# Patient Record
Sex: MALE | Race: WHITE | Employment: OTHER | URBAN - METROPOLITAN AREA
[De-identification: names, ages, dates, MRNs, and addresses within clinical notes are randomized per-mention and may not be internally consistent; named-entity substitution may affect disease eponyms.]

---

## 2023-05-09 PROBLEM — G89.18 POST-OP PAIN: Status: ACTIVE | Noted: 2023-05-09

## 2023-05-09 PROBLEM — M25.512 BILATERAL SHOULDER PAIN: Status: ACTIVE | Noted: 2023-05-09

## 2023-05-09 PROBLEM — M25.511 BILATERAL SHOULDER PAIN: Status: ACTIVE | Noted: 2023-05-09

## 2023-05-09 PROBLEM — R73.03 PREDIABETES: Status: ACTIVE | Noted: 2023-05-09

## 2023-05-09 PROBLEM — G47.9 DIFFICULTY SLEEPING: Status: ACTIVE | Noted: 2023-05-09

## 2023-05-09 PROBLEM — J98.8 RESPIRATORY TRACT INFECTION: Status: ACTIVE | Noted: 2023-05-09

## 2023-05-09 PROBLEM — I65.29 CAROTID ARTERY STENOSIS: Status: ACTIVE | Noted: 2023-05-09

## 2023-05-09 PROBLEM — J38.01 VOCAL CORD PARALYSIS, UNILATERAL COMPLETE: Status: ACTIVE | Noted: 2023-05-09

## 2023-05-09 PROBLEM — G47.33 OSA ON CPAP: Status: ACTIVE | Noted: 2023-05-09

## 2023-05-09 PROBLEM — I25.10 CAD (CORONARY ARTERY DISEASE): Status: ACTIVE | Noted: 2023-05-09

## 2023-05-09 PROBLEM — K21.9 GERD (GASTROESOPHAGEAL REFLUX DISEASE): Status: ACTIVE | Noted: 2023-05-09

## 2023-05-09 PROBLEM — J20.9 ACUTE BRONCHITIS, UNSPECIFIED: Status: ACTIVE | Noted: 2023-05-09

## 2023-05-09 PROBLEM — G45.9 TIA (TRANSIENT ISCHEMIC ATTACK): Status: ACTIVE | Noted: 2023-05-09

## 2023-05-09 PROBLEM — Z95.1 S/P CABG (CORONARY ARTERY BYPASS GRAFT): Status: ACTIVE | Noted: 2023-05-09

## 2023-05-09 PROBLEM — G56.02 CARPAL TUNNEL SYNDROME OF LEFT WRIST: Status: ACTIVE | Noted: 2023-05-09

## 2023-05-09 PROBLEM — I20.9 ANGINA PECTORIS (CMS-HCC): Status: ACTIVE | Noted: 2023-05-09

## 2023-05-09 PROBLEM — R49.0 DYSPHONIA: Status: ACTIVE | Noted: 2023-05-09

## 2023-05-09 PROBLEM — R05.8 PRODUCTIVE COUGH: Status: ACTIVE | Noted: 2023-05-09

## 2023-05-09 PROBLEM — L03.039 CELLULITIS OF GREAT TOE: Status: ACTIVE | Noted: 2023-05-09

## 2023-05-09 PROBLEM — R31.29 OTHER MICROSCOPIC HEMATURIA: Status: ACTIVE | Noted: 2023-05-09

## 2023-05-09 PROBLEM — I10 BENIGN ESSENTIAL HTN: Status: ACTIVE | Noted: 2023-05-09

## 2023-05-09 PROBLEM — I42.9 CARDIOMYOPATHY (MULTI): Status: ACTIVE | Noted: 2023-05-09

## 2023-05-09 PROBLEM — N52.9 ERECTILE DYSFUNCTION: Status: ACTIVE | Noted: 2023-05-09

## 2023-05-09 PROBLEM — R13.12 DYSPHAGIA, OROPHARYNGEAL PHASE: Status: ACTIVE | Noted: 2023-05-09

## 2023-05-09 PROBLEM — E78.5 HYPERLIPEMIA: Status: ACTIVE | Noted: 2023-05-09

## 2023-05-09 PROBLEM — M17.0 OSTEOARTHRITIS OF KNEES, BILATERAL: Status: ACTIVE | Noted: 2023-05-09

## 2023-05-09 PROBLEM — R93.1 ABNORMAL ECHOCARDIOGRAM: Status: ACTIVE | Noted: 2023-05-09

## 2023-05-09 PROBLEM — I65.22 OCCLUSION OF LEFT CAROTID ARTERY: Status: ACTIVE | Noted: 2023-05-09

## 2023-05-09 PROBLEM — K14.8 TONGUE DEVIATION: Status: ACTIVE | Noted: 2023-05-09

## 2023-05-09 RX ORDER — HYDROCHLOROTHIAZIDE 25 MG/1
1 TABLET ORAL DAILY
COMMUNITY
End: 2023-05-30 | Stop reason: ALTCHOICE

## 2023-05-09 RX ORDER — IRON POLYSACCHARIDE COMPLEX 150 MG
150 CAPSULE ORAL DAILY
COMMUNITY
Start: 2017-07-25 | End: 2023-05-30 | Stop reason: ALTCHOICE

## 2023-05-09 RX ORDER — DOCUSATE SODIUM 100 MG/1
100 CAPSULE, LIQUID FILLED ORAL 2 TIMES DAILY
COMMUNITY
Start: 2017-07-25 | End: 2023-05-30 | Stop reason: ALTCHOICE

## 2023-05-09 RX ORDER — PANTOPRAZOLE SODIUM 40 MG/1
1 TABLET, DELAYED RELEASE ORAL DAILY
COMMUNITY
Start: 2017-07-25 | End: 2023-05-30 | Stop reason: ALTCHOICE

## 2023-05-09 RX ORDER — LOSARTAN POTASSIUM 25 MG/1
25 TABLET ORAL
COMMUNITY
Start: 2017-07-25 | End: 2023-05-30 | Stop reason: ALTCHOICE

## 2023-05-09 RX ORDER — MULTIVIT-MIN/IRON FUM/FOLIC AC 7.5 MG-4
1 TABLET ORAL DAILY
COMMUNITY
Start: 2017-07-25

## 2023-05-09 RX ORDER — OMEGA-3 FATTY ACIDS/FISH OIL 340-1000MG
CAPSULE ORAL
COMMUNITY
End: 2023-05-30 | Stop reason: ALTCHOICE

## 2023-05-09 RX ORDER — LOSARTAN POTASSIUM 50 MG/1
1 TABLET ORAL DAILY
COMMUNITY
Start: 2017-08-08 | End: 2023-05-30 | Stop reason: ALTCHOICE

## 2023-05-09 RX ORDER — ACETAMINOPHEN 325 MG/1
325 TABLET ORAL
COMMUNITY
Start: 2017-07-25 | End: 2024-03-18

## 2023-05-09 RX ORDER — METFORMIN HYDROCHLORIDE 1000 MG/1
1 TABLET ORAL 2 TIMES DAILY
COMMUNITY
Start: 2021-07-07 | End: 2023-06-19 | Stop reason: SDUPTHER

## 2023-05-09 RX ORDER — CLOPIDOGREL BISULFATE 75 MG/1
1 TABLET ORAL DAILY
COMMUNITY
Start: 2017-07-17 | End: 2023-06-30 | Stop reason: SDUPTHER

## 2023-05-09 RX ORDER — ATORVASTATIN CALCIUM 40 MG/1
1 TABLET, FILM COATED ORAL DAILY
COMMUNITY
Start: 2017-07-25 | End: 2023-06-19 | Stop reason: SDUPTHER

## 2023-05-09 RX ORDER — OXYCODONE HYDROCHLORIDE 5 MG/1
5 TABLET ORAL EVERY 4 HOURS PRN
COMMUNITY
Start: 2017-07-25 | End: 2023-05-30 | Stop reason: ALTCHOICE

## 2023-05-09 RX ORDER — FUROSEMIDE 40 MG/1
1 TABLET ORAL DAILY
COMMUNITY
Start: 2017-07-25 | End: 2023-05-30 | Stop reason: ALTCHOICE

## 2023-05-09 RX ORDER — POLYETHYLENE GLYCOL 3350 17 G/17G
17 POWDER, FOR SOLUTION ORAL DAILY
COMMUNITY
Start: 2017-07-25 | End: 2023-05-30 | Stop reason: ALTCHOICE

## 2023-05-09 RX ORDER — ASPIRIN 81 MG/1
81 TABLET ORAL DAILY
COMMUNITY

## 2023-05-09 RX ORDER — ACETAMINOPHEN 500 MG
TABLET ORAL DAILY
COMMUNITY
End: 2023-05-30 | Stop reason: ALTCHOICE

## 2023-05-09 RX ORDER — CARVEDILOL 25 MG/1
1 TABLET ORAL 2 TIMES DAILY
COMMUNITY
Start: 2017-07-25 | End: 2023-06-19 | Stop reason: SDUPTHER

## 2023-05-30 ENCOUNTER — OFFICE VISIT (OUTPATIENT)
Dept: PRIMARY CARE | Facility: CLINIC | Age: 73
End: 2023-05-30
Payer: MEDICARE

## 2023-05-30 VITALS
RESPIRATION RATE: 16 BRPM | WEIGHT: 240 LBS | BODY MASS INDEX: 38.57 KG/M2 | TEMPERATURE: 97.5 F | HEIGHT: 66 IN | OXYGEN SATURATION: 97 % | DIASTOLIC BLOOD PRESSURE: 62 MMHG | SYSTOLIC BLOOD PRESSURE: 136 MMHG | HEART RATE: 52 BPM

## 2023-05-30 DIAGNOSIS — R73.03 PREDIABETES: ICD-10-CM

## 2023-05-30 DIAGNOSIS — E53.8 B12 DEFICIENCY: ICD-10-CM

## 2023-05-30 DIAGNOSIS — D64.9 ANEMIA, UNSPECIFIED TYPE: ICD-10-CM

## 2023-05-30 DIAGNOSIS — Z95.1 S/P CABG (CORONARY ARTERY BYPASS GRAFT): ICD-10-CM

## 2023-05-30 DIAGNOSIS — I10 BENIGN ESSENTIAL HTN: Primary | ICD-10-CM

## 2023-05-30 DIAGNOSIS — E78.49 OTHER HYPERLIPIDEMIA: ICD-10-CM

## 2023-05-30 PROCEDURE — 99213 OFFICE O/P EST LOW 20 MIN: CPT | Performed by: STUDENT IN AN ORGANIZED HEALTH CARE EDUCATION/TRAINING PROGRAM

## 2023-05-30 PROCEDURE — 3008F BODY MASS INDEX DOCD: CPT | Performed by: STUDENT IN AN ORGANIZED HEALTH CARE EDUCATION/TRAINING PROGRAM

## 2023-05-30 PROCEDURE — 1159F MED LIST DOCD IN RCRD: CPT | Performed by: STUDENT IN AN ORGANIZED HEALTH CARE EDUCATION/TRAINING PROGRAM

## 2023-05-30 PROCEDURE — 3078F DIAST BP <80 MM HG: CPT | Performed by: STUDENT IN AN ORGANIZED HEALTH CARE EDUCATION/TRAINING PROGRAM

## 2023-05-30 PROCEDURE — 3075F SYST BP GE 130 - 139MM HG: CPT | Performed by: STUDENT IN AN ORGANIZED HEALTH CARE EDUCATION/TRAINING PROGRAM

## 2023-05-30 PROCEDURE — 1160F RVW MEDS BY RX/DR IN RCRD: CPT | Performed by: STUDENT IN AN ORGANIZED HEALTH CARE EDUCATION/TRAINING PROGRAM

## 2023-05-30 PROCEDURE — 1036F TOBACCO NON-USER: CPT | Performed by: STUDENT IN AN ORGANIZED HEALTH CARE EDUCATION/TRAINING PROGRAM

## 2023-05-30 SDOH — ECONOMIC STABILITY: FOOD INSECURITY: WITHIN THE PAST 12 MONTHS, THE FOOD YOU BOUGHT JUST DIDN'T LAST AND YOU DIDN'T HAVE MONEY TO GET MORE.: NEVER TRUE

## 2023-05-30 SDOH — ECONOMIC STABILITY: FOOD INSECURITY: WITHIN THE PAST 12 MONTHS, YOU WORRIED THAT YOUR FOOD WOULD RUN OUT BEFORE YOU GOT MONEY TO BUY MORE.: NEVER TRUE

## 2023-05-30 ASSESSMENT — ENCOUNTER SYMPTOMS
PALPITATIONS: 0
DIARRHEA: 0
FEVER: 0
NAUSEA: 0
VOMITING: 0
UNEXPECTED WEIGHT CHANGE: 0
MUSCULOSKELETAL NEGATIVE: 1
CHILLS: 0
DIZZINESS: 0
OCCASIONAL FEELINGS OF UNSTEADINESS: 0
SHORTNESS OF BREATH: 0
CONFUSION: 0
ABDOMINAL PAIN: 0
LOSS OF SENSATION IN FEET: 0
DEPRESSION: 0
COUGH: 0
WHEEZING: 0
COLOR CHANGE: 0
FATIGUE: 0
CONSTIPATION: 0
HEADACHES: 0

## 2023-05-30 ASSESSMENT — PATIENT HEALTH QUESTIONNAIRE - PHQ9
1. LITTLE INTEREST OR PLEASURE IN DOING THINGS: NOT AT ALL
SUM OF ALL RESPONSES TO PHQ9 QUESTIONS 1 & 2: 0
2. FEELING DOWN, DEPRESSED OR HOPELESS: NOT AT ALL

## 2023-05-30 ASSESSMENT — LIFESTYLE VARIABLES
SKIP TO QUESTIONS 9-10: 1
HOW OFTEN DO YOU HAVE A DRINK CONTAINING ALCOHOL: NEVER
HOW MANY STANDARD DRINKS CONTAINING ALCOHOL DO YOU HAVE ON A TYPICAL DAY: PATIENT DOES NOT DRINK
AUDIT-C TOTAL SCORE: 0
HOW OFTEN DO YOU HAVE SIX OR MORE DRINKS ON ONE OCCASION: NEVER

## 2023-05-30 NOTE — PROGRESS NOTES
"Subjective   Patient ID: Rommel Thompson is a 72 y.o. male who presents for New Patient Visit (Dr. Tip marinelli), Hypertension (Has BP log.), and Insomnia.    HPI   He is here to [facility] and also for FU visit. PMH: CAD s/p bypass surgery: follows w/ Dr. Guerrero.     # HTN/HLD  - io BP was 136/62 and home BP ~ 130/70-80s.   - takes Coreg 25 mg bid and losartan 50 mg daily  - takes atorva 40 mg w/o issues     # Prediabetes  - last A1c 5.7 (07/22)   - Takes metformin 1000 mg bid     # CTS   - getting surgery with ortho soon; recently got clearance from cards; so hope to get surgery done     Review of Systems   Constitutional:  Negative for chills, fatigue, fever and unexpected weight change.   HENT: Negative.     Respiratory:  Negative for cough, shortness of breath and wheezing.    Cardiovascular:  Negative for chest pain, palpitations and leg swelling.   Gastrointestinal:  Negative for abdominal pain, constipation, diarrhea, nausea and vomiting.   Musculoskeletal: Negative.    Skin:  Negative for color change and rash.   Neurological:  Negative for dizziness and headaches.   Psychiatric/Behavioral:  Negative for behavioral problems and confusion.        Objective   /62 (BP Location: Right arm, Patient Position: Sitting, BP Cuff Size: Large adult)   Pulse 52   Temp 36.4 °C (97.5 °F)   Resp 16   Ht 1.676 m (5' 6\")   Wt 109 kg (240 lb)   SpO2 97%   BMI 38.74 kg/m²     Physical Exam  Vitals and nursing note reviewed.   Constitutional:       Appearance: Normal appearance. He is obese.   Cardiovascular:      Rate and Rhythm: Normal rate and regular rhythm.      Pulses: Normal pulses.      Heart sounds: Normal heart sounds.   Pulmonary:      Effort: Pulmonary effort is normal. No respiratory distress.      Breath sounds: Normal breath sounds.   Abdominal:      General: Abdomen is flat. Bowel sounds are normal.      Palpations: Abdomen is soft.   Musculoskeletal:         General: Normal range of motion. "   Neurological:      General: No focal deficit present.      Mental Status: He is alert and oriented to person, place, and time.   Psychiatric:         Mood and Affect: Mood normal.         Behavior: Behavior normal.       Assessment/Plan   He is here to [facility] care and also for FU visit. Overall doing okay, no major concerns today and is clinically & vitally stable. He is UTD on refills for now, will call for refills as indicated.   He will call to [facility] CTS surgery soon as he just got clearance from cards. Has insomnia likely from CTS, declined to be on meds for now; Will let us know post surgery if insomnia continues. Plan as follows     # HTN/HLD # CAD   - controlled, cont same   - rec dash & heart healthy diet     # Prediabetes   - last A1c 5.7 (07/22); repeat A1c as below   - well controlled, cont same     # Anemia  - likely 2/2 B12 def  - cont B12 daily as usual  - repeat B12 as below     Problem List Items Addressed This Visit          Circulatory    Benign essential HTN - Primary    S/P CABG (coronary artery bypass graft)    Relevant Orders    Lipid Panel       Endocrine/Metabolic    Prediabetes    Relevant Orders    Hemoglobin A1c       Other    Hyperlipemia    Relevant Orders    Lipid Panel     Other Visit Diagnoses       Anemia, unspecified type        Relevant Orders    CBC    B12 deficiency        Relevant Orders    Vitamin B12          Rtc in 3mo MCR/FU   Pilo Marquez MD    [facility], Family Medicine

## 2023-06-13 ENCOUNTER — LAB (OUTPATIENT)
Dept: LAB | Facility: LAB | Age: 73
End: 2023-06-13
Payer: MEDICARE

## 2023-06-13 DIAGNOSIS — R73.03 PREDIABETES: ICD-10-CM

## 2023-06-13 DIAGNOSIS — E78.49 OTHER HYPERLIPIDEMIA: ICD-10-CM

## 2023-06-13 DIAGNOSIS — E53.8 B12 DEFICIENCY: ICD-10-CM

## 2023-06-13 DIAGNOSIS — D64.9 ANEMIA, UNSPECIFIED TYPE: ICD-10-CM

## 2023-06-13 DIAGNOSIS — Z95.1 S/P CABG (CORONARY ARTERY BYPASS GRAFT): ICD-10-CM

## 2023-06-13 LAB
CHOLESTEROL (MG/DL) IN SER/PLAS: 141 MG/DL (ref 0–199)
CHOLESTEROL IN HDL (MG/DL) IN SER/PLAS: 41.1 MG/DL
CHOLESTEROL/HDL RATIO: 3.4
COBALAMIN (VITAMIN B12) (PG/ML) IN SER/PLAS: 409 PG/ML (ref 211–911)
ERYTHROCYTE DISTRIBUTION WIDTH (RATIO) BY AUTOMATED COUNT: 13.9 % (ref 11.5–14.5)
ERYTHROCYTE MEAN CORPUSCULAR HEMOGLOBIN CONCENTRATION (G/DL) BY AUTOMATED: 32.6 G/DL (ref 32–36)
ERYTHROCYTE MEAN CORPUSCULAR VOLUME (FL) BY AUTOMATED COUNT: 94 FL (ref 80–100)
ERYTHROCYTES (10*6/UL) IN BLOOD BY AUTOMATED COUNT: 4.35 X10E12/L (ref 4.5–5.9)
ESTIMATED AVERAGE GLUCOSE FOR HBA1C: 117 MG/DL
HEMATOCRIT (%) IN BLOOD BY AUTOMATED COUNT: 40.8 % (ref 41–52)
HEMOGLOBIN (G/DL) IN BLOOD: 13.3 G/DL (ref 13.5–17.5)
HEMOGLOBIN A1C/HEMOGLOBIN TOTAL IN BLOOD: 5.7 %
LDL: 77 MG/DL (ref 0–99)
LEUKOCYTES (10*3/UL) IN BLOOD BY AUTOMATED COUNT: 7.8 X10E9/L (ref 4.4–11.3)
PLATELETS (10*3/UL) IN BLOOD AUTOMATED COUNT: 183 X10E9/L (ref 150–450)
TRIGLYCERIDE (MG/DL) IN SER/PLAS: 116 MG/DL (ref 0–149)
VLDL: 23 MG/DL (ref 0–40)

## 2023-06-13 PROCEDURE — 36415 COLL VENOUS BLD VENIPUNCTURE: CPT

## 2023-06-13 PROCEDURE — 80061 LIPID PANEL: CPT

## 2023-06-13 PROCEDURE — 85027 COMPLETE CBC AUTOMATED: CPT

## 2023-06-13 PROCEDURE — 82607 VITAMIN B-12: CPT

## 2023-06-13 PROCEDURE — 83036 HEMOGLOBIN GLYCOSYLATED A1C: CPT

## 2023-06-19 DIAGNOSIS — E78.5 HYPERLIPIDEMIA, UNSPECIFIED HYPERLIPIDEMIA TYPE: Primary | ICD-10-CM

## 2023-06-19 DIAGNOSIS — I10 HTN (HYPERTENSION), BENIGN: Chronic | ICD-10-CM

## 2023-06-19 DIAGNOSIS — R73.03 PREDIABETES: ICD-10-CM

## 2023-06-19 DIAGNOSIS — Z95.1 S/P CABG (CORONARY ARTERY BYPASS GRAFT): ICD-10-CM

## 2023-06-19 PROBLEM — R19.7 ACUTE DIARRHEA: Status: ACTIVE | Noted: 2023-06-19

## 2023-06-19 PROBLEM — M54.12 CERVICAL RADICULOPATHY, CHRONIC: Status: ACTIVE | Noted: 2023-06-19

## 2023-06-19 PROBLEM — M19.011 OSTEOARTHRITIS OF RIGHT SHOULDER: Status: ACTIVE | Noted: 2023-06-19

## 2023-06-19 PROBLEM — D64.9 ANEMIA: Status: ACTIVE | Noted: 2023-06-19

## 2023-06-19 PROBLEM — M19.012 OSTEOARTHRITIS OF LEFT SHOULDER: Status: ACTIVE | Noted: 2023-06-19

## 2023-06-19 PROBLEM — E53.8 VITAMIN B12 DEFICIENCY: Status: ACTIVE | Noted: 2023-06-19

## 2023-06-19 RX ORDER — ACETAMINOPHEN, DEXTROMETHORPHAN HBR, DOXYLAMINE SUCCINATE, PHENYLEPHRINE HCL 650; 20; 12.5; 1 MG/30ML; MG/30ML; MG/30ML; MG/30ML
SOLUTION ORAL
COMMUNITY
Start: 2022-12-02

## 2023-06-19 RX ORDER — LOSARTAN POTASSIUM 50 MG/1
50 TABLET ORAL DAILY
COMMUNITY
End: 2023-06-19 | Stop reason: SDUPTHER

## 2023-06-19 NOTE — TELEPHONE ENCOUNTER
Pt also needs instruction.  He is having carpal tunnel surgery 6/27.  They told him to stop the aspirin but said his pcp needs to advise on the plavix.  Should he stop it and when?  Thank you. Shira.

## 2023-06-21 RX ORDER — ATORVASTATIN CALCIUM 40 MG/1
40 TABLET, FILM COATED ORAL DAILY
Qty: 90 TABLET | Refills: 0 | Status: SHIPPED | OUTPATIENT
Start: 2023-06-21 | End: 2023-09-07 | Stop reason: SDUPTHER

## 2023-06-21 RX ORDER — CARVEDILOL 25 MG/1
25 TABLET ORAL 2 TIMES DAILY
Qty: 180 TABLET | Refills: 0 | Status: SHIPPED | OUTPATIENT
Start: 2023-06-21 | End: 2023-09-07 | Stop reason: SDUPTHER

## 2023-06-21 RX ORDER — CLOPIDOGREL BISULFATE 75 MG/1
75 TABLET ORAL DAILY
Qty: 90 TABLET | Refills: 0 | Status: CANCELLED | OUTPATIENT
Start: 2023-06-21

## 2023-06-21 RX ORDER — LOSARTAN POTASSIUM 50 MG/1
50 TABLET ORAL DAILY
Qty: 90 TABLET | Refills: 0 | Status: SHIPPED | OUTPATIENT
Start: 2023-06-21 | End: 2023-07-31 | Stop reason: DRUGHIGH

## 2023-06-21 RX ORDER — METFORMIN HYDROCHLORIDE 1000 MG/1
1000 TABLET ORAL 2 TIMES DAILY
Qty: 180 TABLET | Refills: 0 | Status: SHIPPED | OUTPATIENT
Start: 2023-06-21 | End: 2023-09-07 | Stop reason: SDUPTHER

## 2023-06-21 NOTE — TELEPHONE ENCOUNTER
Pt saw cardiology and they told him to hold plavix for 3 days prior to surgery.  Pt states he has always got plavix from this office.  It looks like Tip did rx it last on 12/22.  Pt is about to run out.  Would you fill?

## 2023-06-27 ENCOUNTER — HOSPITAL ENCOUNTER (OUTPATIENT)
Dept: DATA CONVERSION | Facility: HOSPITAL | Age: 73
End: 2023-06-27
Attending: ORTHOPAEDIC SURGERY | Admitting: ORTHOPAEDIC SURGERY
Payer: MEDICARE

## 2023-06-27 DIAGNOSIS — G56.02 CARPAL TUNNEL SYNDROME, LEFT UPPER LIMB: ICD-10-CM

## 2023-06-27 DIAGNOSIS — G62.9 POLYNEUROPATHY, UNSPECIFIED: ICD-10-CM

## 2023-06-27 DIAGNOSIS — E78.5 HYPERLIPIDEMIA, UNSPECIFIED: ICD-10-CM

## 2023-06-27 DIAGNOSIS — K21.9 GASTRO-ESOPHAGEAL REFLUX DISEASE WITHOUT ESOPHAGITIS: ICD-10-CM

## 2023-06-27 DIAGNOSIS — Z79.02 LONG TERM (CURRENT) USE OF ANTITHROMBOTICS/ANTIPLATELETS: ICD-10-CM

## 2023-06-27 DIAGNOSIS — G47.33 OBSTRUCTIVE SLEEP APNEA (ADULT) (PEDIATRIC): ICD-10-CM

## 2023-06-27 DIAGNOSIS — I10 ESSENTIAL (PRIMARY) HYPERTENSION: ICD-10-CM

## 2023-06-27 DIAGNOSIS — I42.9 CARDIOMYOPATHY, UNSPECIFIED (MULTI): ICD-10-CM

## 2023-06-27 DIAGNOSIS — Z86.73 PERSONAL HISTORY OF TRANSIENT ISCHEMIC ATTACK (TIA), AND CEREBRAL INFARCTION WITHOUT RESIDUAL DEFICITS: ICD-10-CM

## 2023-06-27 DIAGNOSIS — Z95.1 PRESENCE OF AORTOCORONARY BYPASS GRAFT: ICD-10-CM

## 2023-06-27 DIAGNOSIS — I25.10 ATHEROSCLEROTIC HEART DISEASE OF NATIVE CORONARY ARTERY WITHOUT ANGINA PECTORIS: ICD-10-CM

## 2023-06-27 DIAGNOSIS — D64.9 ANEMIA, UNSPECIFIED: ICD-10-CM

## 2023-06-27 DIAGNOSIS — Z79.82 LONG TERM (CURRENT) USE OF ASPIRIN: ICD-10-CM

## 2023-06-27 DIAGNOSIS — Z79.84 LONG TERM (CURRENT) USE OF ORAL HYPOGLYCEMIC DRUGS: ICD-10-CM

## 2023-06-29 DIAGNOSIS — I65.29 STENOSIS OF CAROTID ARTERY, UNSPECIFIED LATERALITY: Primary | ICD-10-CM

## 2023-06-30 RX ORDER — CLOPIDOGREL BISULFATE 75 MG/1
75 TABLET ORAL DAILY
Qty: 90 TABLET | Refills: 1 | Status: SHIPPED | OUTPATIENT
Start: 2023-06-30 | End: 2023-12-19

## 2023-06-30 NOTE — TELEPHONE ENCOUNTER
Pt said 75 mg once a day.  Pt sates he has asked the heart dr again to fill and didn't get an answer.  Pt said he will run out in 2 days.  Please fill. Thank you

## 2023-07-31 ENCOUNTER — OFFICE VISIT (OUTPATIENT)
Dept: PRIMARY CARE | Facility: CLINIC | Age: 73
End: 2023-07-31
Payer: MEDICARE

## 2023-07-31 VITALS
OXYGEN SATURATION: 99 % | BODY MASS INDEX: 37.45 KG/M2 | WEIGHT: 232 LBS | HEART RATE: 68 BPM | RESPIRATION RATE: 16 BRPM | SYSTOLIC BLOOD PRESSURE: 134 MMHG | TEMPERATURE: 96.8 F | DIASTOLIC BLOOD PRESSURE: 64 MMHG

## 2023-07-31 DIAGNOSIS — R35.0 URINARY FREQUENCY: ICD-10-CM

## 2023-07-31 DIAGNOSIS — E53.8 VITAMIN B12 DEFICIENCY: ICD-10-CM

## 2023-07-31 DIAGNOSIS — I10 HTN (HYPERTENSION), BENIGN: Chronic | ICD-10-CM

## 2023-07-31 DIAGNOSIS — R10.9 ACUTE RIGHT FLANK PAIN: ICD-10-CM

## 2023-07-31 DIAGNOSIS — E78.5 HYPERLIPIDEMIA, UNSPECIFIED HYPERLIPIDEMIA TYPE: Primary | Chronic | ICD-10-CM

## 2023-07-31 DIAGNOSIS — I25.10 CORONARY ARTERY DISEASE INVOLVING NATIVE CORONARY ARTERY OF NATIVE HEART, UNSPECIFIED WHETHER ANGINA PRESENT: ICD-10-CM

## 2023-07-31 DIAGNOSIS — R73.03 PREDIABETES: ICD-10-CM

## 2023-07-31 LAB
POC APPEARANCE, URINE: CLEAR
POC BILIRUBIN, URINE: ABNORMAL
POC BLOOD, URINE: NEGATIVE
POC COLOR, URINE: YELLOW
POC GLUCOSE, URINE: NEGATIVE MG/DL
POC KETONES, URINE: ABNORMAL MG/DL
POC LEUKOCYTES, URINE: NEGATIVE
POC NITRITE,URINE: NEGATIVE
POC PH, URINE: 5 PH
POC PROTEIN, URINE: ABNORMAL MG/DL
POC SPECIFIC GRAVITY, URINE: >=1.03
POC UROBILINOGEN, URINE: 1 EU/DL

## 2023-07-31 PROCEDURE — 3078F DIAST BP <80 MM HG: CPT | Performed by: INTERNAL MEDICINE

## 2023-07-31 PROCEDURE — 3075F SYST BP GE 130 - 139MM HG: CPT | Performed by: INTERNAL MEDICINE

## 2023-07-31 PROCEDURE — 1126F AMNT PAIN NOTED NONE PRSNT: CPT | Performed by: INTERNAL MEDICINE

## 2023-07-31 PROCEDURE — 99214 OFFICE O/P EST MOD 30 MIN: CPT | Performed by: INTERNAL MEDICINE

## 2023-07-31 PROCEDURE — 1159F MED LIST DOCD IN RCRD: CPT | Performed by: INTERNAL MEDICINE

## 2023-07-31 PROCEDURE — 81003 URINALYSIS AUTO W/O SCOPE: CPT | Performed by: INTERNAL MEDICINE

## 2023-07-31 PROCEDURE — 1036F TOBACCO NON-USER: CPT | Performed by: INTERNAL MEDICINE

## 2023-07-31 PROCEDURE — 87086 URINE CULTURE/COLONY COUNT: CPT

## 2023-07-31 PROCEDURE — 1160F RVW MEDS BY RX/DR IN RCRD: CPT | Performed by: INTERNAL MEDICINE

## 2023-07-31 PROCEDURE — 3008F BODY MASS INDEX DOCD: CPT | Performed by: INTERNAL MEDICINE

## 2023-07-31 RX ORDER — LOSARTAN POTASSIUM 25 MG/1
25 TABLET ORAL DAILY
Qty: 90 TABLET | Refills: 3 | Status: SHIPPED | OUTPATIENT
Start: 2023-07-31 | End: 2023-09-07 | Stop reason: SDUPTHER

## 2023-07-31 RX ORDER — TAMSULOSIN HYDROCHLORIDE 0.4 MG/1
0.4 CAPSULE ORAL DAILY
Qty: 30 CAPSULE | Refills: 3 | Status: SHIPPED | OUTPATIENT
Start: 2023-07-31 | End: 2023-12-19

## 2023-07-31 ASSESSMENT — PAIN SCALES - GENERAL: PAINLEVEL: 0-NO PAIN

## 2023-07-31 NOTE — ASSESSMENT & PLAN NOTE
Patient has some symptoms of orthostatic hypotension today, he takes carvedilol and losartan, will decrease the losartan to 25 mg daily

## 2023-07-31 NOTE — PROGRESS NOTES
Chief Complaint/HPI:  Initial visit with me: He has seen Dr Marquez in the past.  Has a history of hypospadias he states. He has not recently seen a urologist.    Acute visit/ Patient c/o difficulty urinating for 2 weeks. Patient is able to urinated, he has noted a weak stream, he denies urinary discomfort. Patient also notes some right lower back pain, it has improved somewhat. Patient has been drinking a lot of water he states. Patient gets up to urinate 1-2 times per night when lying on the back. Nocturia is not unusual per the patient. Patient does not feel that he is emptying the bladder completely.      ROS otherwise negative aside from what was mentioned above in HPI.      Patient Active Problem List   Diagnosis    Difficulty sleeping    Cellulitis of great toe    Carpal tunnel syndrome of left wrist    Carotid artery stenosis    Cardiomyopathy (CMS/HCC)    CAD (coronary artery disease)    Bilateral shoulder pain    Benign essential HTN    Acute bronchitis, unspecified    Abnormal echocardiogram    Angina pectoris (CMS/HCC)    Dysphagia, oropharyngeal phase    Dysphonia    Erectile dysfunction    GERD (gastroesophageal reflux disease)    Hyperlipemia    Occlusion of left carotid artery    Respiratory tract infection    Productive cough    Prediabetes    Post-op pain    Other microscopic hematuria    Osteoarthritis of knees, bilateral    ALVIN on CPAP    S/P CABG (coronary artery bypass graft)    TIA (transient ischemic attack)    Vocal cord paralysis, unilateral complete    Tongue deviation    Acute diarrhea    Anemia    Bilateral pseudophakia    Cervical radiculopathy, chronic    Osteoarthritis of left shoulder    Osteoarthritis of right shoulder    Severe obesity (BMI 35.0-39.9) with comorbidity (CMS/HCC)    Vitamin B12 deficiency    HTN (hypertension), benign    Left carpal tunnel syndrome    ED (erectile dysfunction)    Hyperlipidemia    Arthritis of both knees    Urinary frequency    Acute right flank pain          Past Medical History:   Diagnosis Date    Bilateral primary osteoarthritis of knee 06/28/2022    Osteoarthritis of knees, bilateral    Carpal tunnel syndrome, left upper limb 01/06/2023    Carpal tunnel syndrome of left wrist    Essential (primary) hypertension 08/09/2022    Benign essential HTN    Gastro-esophageal reflux disease without esophagitis 06/28/2022    GERD (gastroesophageal reflux disease)    Hyperlipidemia, unspecified 08/09/2022    Hyperlipemia    Male erectile dysfunction, unspecified 08/21/2016    Erectile dysfunction    Morbid (severe) obesity due to excess calories (CMS/[facility]) 08/09/2022    Class 2 severe obesity with serious comorbidity and body mass index (BMI) of 37.0 to 37.9 in adult, unspecified obesity type    Obstructive sleep apnea (adult) (pediatric) 09/08/2022    ALVIN on CPAP    Other conditions influencing health status 08/21/2016    Normal echocardiogram    Personal history of other diseases of the circulatory system     History of hypertension    Personal history of other endocrine, nutritional and metabolic disease 08/21/2016    History of diabetes mellitus    Personal history of urinary (tract) infections 06/29/2016    History of urinary tract infection    Prediabetes 08/09/2022    Prediabetes     Past Surgical History:   Procedure Laterality Date    CORONARY ARTERY BYPASS GRAFT  08/08/2017    CABG    CT HEAD ANGIO W AND WO IV CONTRAST  8/24/2017    CT HEAD ANGIO W AND WO IV CONTRAST 8/24/2017 [facility] [place] [facility]    CT NECK ANGIO W AND WO IV CONTRAST  8/24/2017    CT NECK ANGIO W AND WO IV CONTRAST 8/24/2017 [facility] [place] [facility]    GASTRIC BYPASS  07/17/2017    High Gastric Bypass    OTHER SURGICAL HISTORY  07/17/2017    Carotid Artery Catheterization    TOTAL KNEE ARTHROPLASTY  05/12/2016    Knee Replacement     Social History     Social History Narrative    Not on file         ALLERGIES  Sulfamethoxazole-trimethoprim      MEDICATIONS  Current Outpatient Medications on File  Prior to Visit   Medication Sig Dispense Refill    acetaminophen (Tylenol) 325 mg tablet Take 1 tablet (325 mg) by mouth every 4 hours.      aspirin 81 mg EC tablet Take 1 tablet (81 mg) by mouth once daily.      atorvastatin (Lipitor) 40 mg tablet Take 1 tablet (40 mg) by mouth once daily. 90 tablet 0    carvedilol (Coreg) 25 mg tablet Take 1 tablet (25 mg) by mouth 2 times a day. 180 tablet 0    clopidogrel (Plavix) 75 mg tablet Take 1 tablet (75 mg) by mouth once daily. 90 tablet 1    cyanocobalamin, vitamin B-12, (Vitamin B-12) 1,000 mcg tablet extended release       metFORMIN (Glucophage) 1,000 mg tablet Take 1 tablet (1,000 mg) by mouth 2 times a day. 180 tablet 0    multivitamin with minerals (multivit-min-iron fum-folic ac) tablet Take 1 tablet by mouth once daily.      [DISCONTINUED] losartan (Cozaar) 50 mg tablet Take 1 tablet (50 mg) by mouth once daily. 90 tablet 0     No current facility-administered medications on file prior to visit.         PHYSICAL EXAM  /64 (BP Location: Left arm, Patient Position: Sitting, BP Cuff Size: Adult)   Pulse 68   Temp 36 °C (96.8 °F) (Temporal)   Resp 16   Wt 105 kg (232 lb)   SpO2 99%   BMI 37.45 kg/m²   Body mass index is 37.45 kg/m².  Gen: Alert, NAD, he is overweight, pleasant, patient notes some lightheadedness when standing from sitting position  HEENT:  EOMI, conjunctiva and sclera normal in appearance  Respiratory:  Lungs CTAB  Cardiovascular:  Heart RRR. No M/R/G, a questionable very soft left carotid bruit is noted  : no bladder tenderness is noted, abdomen is obese, patient does have tenderness over the right flank region.   Neuro:  Gross motor and sensory intact  Skin:  No suspicious lesions present    ASSESSMENT/PLAN  Problem List Items Addressed This Visit       CAD (coronary artery disease)    Prediabetes    Vitamin B12 deficiency    HTN (hypertension), benign (Chronic)    Current Assessment & Plan     Patient has some symptoms of orthostatic  hypotension today, he takes carvedilol and losartan, will decrease the losartan to 25 mg daily         Relevant Medications    losartan (Cozaar) 25 mg tablet    Hyperlipidemia - Primary (Chronic)    Overview     Formatting of this note might be different from the original. 1996 Formatting of this note might be different from the original. 1996         Urinary frequency    Relevant Medications    tamsulosin (Flomax) 0.4 mg 24 hr capsule    Other Relevant Orders    POCT UA Automated manually resulted    Urine Culture    PSA, total and free    Acute right flank pain    Relevant Medications    tamsulosin (Flomax) 0.4 mg 24 hr capsule    Other Relevant Orders    POCT UA Automated manually resulted    Urine Culture    PSA, total and free     Follow up for routine visit after testing is completed    Shawn Sharma MD

## 2023-08-02 LAB — URINE CULTURE: NORMAL

## 2023-08-10 ENCOUNTER — LAB (OUTPATIENT)
Dept: LAB | Facility: LAB | Age: 73
End: 2023-08-10
Payer: MEDICARE

## 2023-08-10 DIAGNOSIS — R10.9 ACUTE RIGHT FLANK PAIN: ICD-10-CM

## 2023-08-10 DIAGNOSIS — R35.0 URINARY FREQUENCY: ICD-10-CM

## 2023-08-10 PROCEDURE — 36415 COLL VENOUS BLD VENIPUNCTURE: CPT

## 2023-08-10 PROCEDURE — 84153 ASSAY OF PSA TOTAL: CPT

## 2023-08-10 PROCEDURE — 84154 ASSAY OF PSA FREE: CPT

## 2023-08-14 LAB
PROSTATE SPECIFIC AG (NG/ML) IN SER/PLAS: 2.2 NG/ML (ref 0–4)
PROSTATE SPECIFIC AG FREE (NG/ML) IN SER/PLAS: 0.5 NG/ML
PROSTATE SPECIFIC AG FREE/PROSTATE SPECIFIC AG TOTAL IN SER/PLAS: 23 %

## 2023-09-07 ENCOUNTER — OFFICE VISIT (OUTPATIENT)
Dept: PRIMARY CARE | Facility: CLINIC | Age: 73
End: 2023-09-07
Payer: MEDICARE

## 2023-09-07 VITALS
HEART RATE: 56 BPM | DIASTOLIC BLOOD PRESSURE: 67 MMHG | BODY MASS INDEX: 38.09 KG/M2 | OXYGEN SATURATION: 96 % | SYSTOLIC BLOOD PRESSURE: 128 MMHG | WEIGHT: 236 LBS

## 2023-09-07 DIAGNOSIS — Z00.00 ROUTINE GENERAL MEDICAL EXAMINATION AT HEALTH CARE FACILITY: Primary | ICD-10-CM

## 2023-09-07 DIAGNOSIS — I10 HTN (HYPERTENSION), BENIGN: Chronic | ICD-10-CM

## 2023-09-07 DIAGNOSIS — N40.1 BENIGN PROSTATIC HYPERPLASIA WITH NOCTURIA: ICD-10-CM

## 2023-09-07 DIAGNOSIS — R73.03 PREDIABETES: ICD-10-CM

## 2023-09-07 DIAGNOSIS — Z12.12 SCREENING FOR COLORECTAL CANCER: ICD-10-CM

## 2023-09-07 DIAGNOSIS — E78.5 HYPERLIPIDEMIA, UNSPECIFIED HYPERLIPIDEMIA TYPE: ICD-10-CM

## 2023-09-07 DIAGNOSIS — R35.1 BENIGN PROSTATIC HYPERPLASIA WITH NOCTURIA: ICD-10-CM

## 2023-09-07 DIAGNOSIS — Z95.1 S/P CABG (CORONARY ARTERY BYPASS GRAFT): ICD-10-CM

## 2023-09-07 DIAGNOSIS — E66.9 CLASS 2 OBESITY WITHOUT SERIOUS COMORBIDITY WITH BODY MASS INDEX (BMI) OF 38.0 TO 38.9 IN ADULT, UNSPECIFIED OBESITY TYPE: ICD-10-CM

## 2023-09-07 DIAGNOSIS — Z12.11 SCREENING FOR COLORECTAL CANCER: ICD-10-CM

## 2023-09-07 PROCEDURE — 3074F SYST BP LT 130 MM HG: CPT | Performed by: STUDENT IN AN ORGANIZED HEALTH CARE EDUCATION/TRAINING PROGRAM

## 2023-09-07 PROCEDURE — 3008F BODY MASS INDEX DOCD: CPT | Performed by: STUDENT IN AN ORGANIZED HEALTH CARE EDUCATION/TRAINING PROGRAM

## 2023-09-07 PROCEDURE — G0439 PPPS, SUBSEQ VISIT: HCPCS | Performed by: STUDENT IN AN ORGANIZED HEALTH CARE EDUCATION/TRAINING PROGRAM

## 2023-09-07 PROCEDURE — 3078F DIAST BP <80 MM HG: CPT | Performed by: STUDENT IN AN ORGANIZED HEALTH CARE EDUCATION/TRAINING PROGRAM

## 2023-09-07 PROCEDURE — 1170F FXNL STATUS ASSESSED: CPT | Performed by: STUDENT IN AN ORGANIZED HEALTH CARE EDUCATION/TRAINING PROGRAM

## 2023-09-07 PROCEDURE — 1036F TOBACCO NON-USER: CPT | Performed by: STUDENT IN AN ORGANIZED HEALTH CARE EDUCATION/TRAINING PROGRAM

## 2023-09-07 PROCEDURE — 1160F RVW MEDS BY RX/DR IN RCRD: CPT | Performed by: STUDENT IN AN ORGANIZED HEALTH CARE EDUCATION/TRAINING PROGRAM

## 2023-09-07 PROCEDURE — 99214 OFFICE O/P EST MOD 30 MIN: CPT | Performed by: STUDENT IN AN ORGANIZED HEALTH CARE EDUCATION/TRAINING PROGRAM

## 2023-09-07 PROCEDURE — 1126F AMNT PAIN NOTED NONE PRSNT: CPT | Performed by: STUDENT IN AN ORGANIZED HEALTH CARE EDUCATION/TRAINING PROGRAM

## 2023-09-07 PROCEDURE — 1159F MED LIST DOCD IN RCRD: CPT | Performed by: STUDENT IN AN ORGANIZED HEALTH CARE EDUCATION/TRAINING PROGRAM

## 2023-09-07 RX ORDER — ATORVASTATIN CALCIUM 40 MG/1
40 TABLET, FILM COATED ORAL DAILY
Qty: 90 TABLET | Refills: 1 | Status: SHIPPED | OUTPATIENT
Start: 2023-09-07 | End: 2024-03-12

## 2023-09-07 RX ORDER — LOSARTAN POTASSIUM 50 MG/1
50 TABLET ORAL DAILY
Qty: 90 TABLET | Refills: 1 | Status: SHIPPED | OUTPATIENT
Start: 2023-09-07 | End: 2024-03-18

## 2023-09-07 RX ORDER — METFORMIN HYDROCHLORIDE 1000 MG/1
1000 TABLET ORAL 2 TIMES DAILY
Qty: 180 TABLET | Refills: 1 | Status: SHIPPED | OUTPATIENT
Start: 2023-09-07 | End: 2024-03-18

## 2023-09-07 RX ORDER — CARVEDILOL 25 MG/1
25 TABLET ORAL 2 TIMES DAILY
Qty: 180 TABLET | Refills: 1 | Status: SHIPPED | OUTPATIENT
Start: 2023-09-07 | End: 2024-03-12

## 2023-09-07 ASSESSMENT — PATIENT HEALTH QUESTIONNAIRE - PHQ9
1. LITTLE INTEREST OR PLEASURE IN DOING THINGS: NOT AT ALL
2. FEELING DOWN, DEPRESSED OR HOPELESS: NOT AT ALL
SUM OF ALL RESPONSES TO PHQ9 QUESTIONS 1 AND 2: 0

## 2023-09-07 ASSESSMENT — ACTIVITIES OF DAILY LIVING (ADL)
DOING_HOUSEWORK: INDEPENDENT
DRESSING: INDEPENDENT
BATHING: INDEPENDENT
GROCERY_SHOPPING: INDEPENDENT
TAKING_MEDICATION: INDEPENDENT
MANAGING_FINANCES: INDEPENDENT

## 2023-09-07 ASSESSMENT — ENCOUNTER SYMPTOMS
DIZZINESS: 0
COLOR CHANGE: 0
NAUSEA: 0
CONSTIPATION: 0
FATIGUE: 0
CONFUSION: 0
COUGH: 0
CHILLS: 0
DIARRHEA: 0
FEVER: 0
HEADACHES: 0
ABDOMINAL PAIN: 0
WHEEZING: 0
VOMITING: 0
SHORTNESS OF BREATH: 0
PALPITATIONS: 0
MUSCULOSKELETAL NEGATIVE: 1
UNEXPECTED WEIGHT CHANGE: 0

## 2023-09-07 NOTE — PROGRESS NOTES
Subjective   Patient ID: Rommel Thompson is a 72 y.o. male who presents for Medicare Annual Wellness Visit Initial (Pt is here for [facility] visit. ) and Follow-up (Pt would like to talk about PSA blood result test. Said he got to pull his tooth off so wants to talk about that.).   He is here for MCR and also for Fu visit. He saw [facility] last mo for having issues with urination; had UA io with neg result and also had PSA drawn (08/10/23) w/ normal result. He was started on flomax 0.4 mg daily, doing better.     # HTN/HLD # CAD   - io /67  - takes coreg 25 mg bid and losartan 50 mg daily   (off note: losartan was dec to 25 mg during visit w/ [facility] however his card wants to cont same 50 mg; so taking 50 mg now)   - takes atorva 40 mg daily      # Prediabetes   - last A1c 5.7 (06/23)  - takes metformin 1000 mg bid      Past Medical, Surgical, and Family History reviewed and updated in chart.    Reviewed all medications by prescribing practitioner or clinical pharmacist (such as prescriptions, OTCs, herbal therapies and supplements) and documented in the medical record.    HPI  #HM stuffs  Screening tests:  - Colonoscopy (age 45-75): due now; okay to get   - PSA (age 50 and older): UTD   - Lipid profile: UTD per emr     Primary prevention:  - Flu shot: will get at pharmacy   - COVID vaccines:   - Tdap shot: utd   - Shingles shot (age >50): UTD   - Prevnar 20: UTD   - Statin (age 40-65 or high risk): taking     Counseling:   - ETOH (age>18):  dinks wine w/ dinner   - Smoking: none     Patient Care Team:  Pilo Marquez MD as PCP - General (Family Medicine)  Shawn Sharma MD as PCP - [employer] PCP     Review of Systems   Constitutional:  Negative for chills, fatigue, fever and unexpected weight change.   HENT: Negative.     Respiratory:  Negative for cough, shortness of breath and wheezing.    Cardiovascular:  Negative for chest pain, palpitations and leg swelling.   Gastrointestinal:  Negative for  abdominal pain, constipation, diarrhea, nausea and vomiting.   Musculoskeletal: Negative.    Skin:  Negative for color change and rash.   Neurological:  Negative for dizziness and headaches.   Psychiatric/Behavioral:  Negative for behavioral problems and confusion.        Objective   Vitals:  /67 (BP Location: Left arm, Patient Position: Sitting, BP Cuff Size: Adult)   Pulse 56   Wt 107 kg (236 lb)   SpO2 96%   BMI 38.09 kg/m²       Physical Exam  Vitals and nursing note reviewed.   Constitutional:       Appearance: Normal appearance. He is obese.   HENT:      Right Ear: Tympanic membrane normal.      Left Ear: Tympanic membrane normal.   Eyes:      Extraocular Movements: Extraocular movements intact.      Pupils: Pupils are equal, round, and reactive to light.   Cardiovascular:      Rate and Rhythm: Normal rate and regular rhythm.      Pulses: Normal pulses.      Heart sounds: Normal heart sounds.   Pulmonary:      Effort: Pulmonary effort is normal. No respiratory distress.      Breath sounds: Normal breath sounds.   Abdominal:      General: Abdomen is flat. Bowel sounds are normal.      Palpations: Abdomen is soft.   Musculoskeletal:         General: Normal range of motion.   Neurological:      General: No focal deficit present.      Mental Status: He is alert and oriented to person, place, and time.   Psychiatric:         Mood and Affect: Mood normal.         Behavior: Behavior normal.       Assessment/Plan   He is here for MCR and also for Fu visit.  He is doing okay; his urinary sx improved on flomax daily; will cont same. He is otherwise clinically & vitally stable. Plan as follows     # HTN/HLD # CAD   - BP well controlled, cont same   - cont Coreg 25 mg bid and losartan 50 mg daily   - cont statin per emr, tolerating well   - cont to FW cards yearly or as schd   - encourage heart healthy & DASH diet; continue exercise as tolerated, at least 150 mins per wk      # Prediabetes   - last A1c 5.7  (06/23), controlled   - cont same: metformin 1000 mg bid     # BPH  - doing better on flomax, cont same     # MCR wellness # HM visit   - will work on POA/living will paper work   - UTD on immunization per emr   - UTD on age appropriate screenings as listed above in MCR summary, ordered c'gloria    - refer MCR summary above for detail    Problem List Items Addressed This Visit       Prediabetes    Relevant Medications    metFORMIN (Glucophage) 1,000 mg tablet    S/P CABG (coronary artery bypass graft)    Relevant Medications    carvedilol (Coreg) 25 mg tablet    atorvastatin (Lipitor) 40 mg tablet    HTN (hypertension), benign (Chronic)    Relevant Medications    losartan (Cozaar) 50 mg tablet    Hyperlipidemia (Chronic)    Overview     Formatting of this note might be different from the original. 1996 Formatting of this note might be different from the original. 1996         Relevant Medications    atorvastatin (Lipitor) 40 mg tablet     Other Visit Diagnoses       Routine general medical examination at health care facility    -  Primary    Class 2 obesity without serious comorbidity with body mass index (BMI) of 38.0 to 38.9 in adult, unspecified obesity type        Screening for colorectal cancer        Relevant Orders    Colonoscopy Screening    Benign prostatic hyperplasia with nocturia              Rtc 6 mo for FU   Pilo Marquez MD    [facility], Family Medicine

## 2023-09-30 NOTE — H&P
History & Physical Reviewed:   I have reviewed the History and Physical dated:  27-Jun-2023   History and Physical reviewed and relevant findings noted. Patient examined to review pertinent physical  findings.: No significant changes   Home Medications Reviewed: no changes noted   Allergies Reviewed: no changes noted       ERAS (Enhanced Recovery After Surgery):  ·  ERAS Patient: no     Consent:   COVID-19 Consent:  ·  COVID-19 Risk Consent Surgeon has reviewed key risks related to the risk of yohana COVID-19 and if they contract COVID-19 what the risks are.       Electronic Signatures:  STEFAN Mead James ()  (Signed 27-Jun-2023 08:33)   Authored: History & Physical Reviewed, ERAS, Consent,  Note Completion      Last Updated: 27-Jun-2023 08:33 by STEFAN Mead James ()

## 2023-09-30 NOTE — PROGRESS NOTES
Service: Orthopaedics     Subjective Data:   ROMMEL ROSA is a 72 year old Male who is Hospital Day # 3.    Objective Data:     Objective Information:    ORTHOPEDIC OPERATIVE NOTE    Name: Rommel Rosa  : 50  Surgeon: Larry Mead DO  Facility: [facility]  Date of Surgery: 23     SURGEON:     Larry Mead DO  PRE OP DIAGNOSIS:  Carpal Tunnel Syndrome left Wrist  POST OP DIAGNOSIS:  Same  PROCEDURE:   Endoscopic Carpal Tunnel Release left Wrist    ANESTHESIA:  Local with sedation  ASSISTANT:    Sa Horvath  COMPLICATIONS:   None.  CONDITION:    Satisfactory to PACU.  BLOOD LOSS: Minimal    INDICATION FOR PROCEDURE: The patient is a 72-year-old male who has failed nonsurgical management for left carpal tunnel syndrome. The patient was seen in the office, fully informed risks and benefits of the procedure, and elected to undergo the procedure.    PROCEDURE IN DETAIL: The patient was seen and consented preoperatively with side and site of surgery appropriately marked. The patient was taken back to the operative suite, placed supine on the operative table, and placed on monitor for the duration  of case. The patient was administered sedation and monitored throughout the entire surgery by Department of Anesthesia. While sedated, the patient was administered 5 cc of mixed marcaine and lidocaine to the volar aspect of wrist and palm for local anesthesia.  The operative upper extremity was then sterilely prepped and draped in sterile orthopedic fashion, elevated with an Esmarch, and tourniquet inflated to 250 mmHg for the duration of case, and time-out was performed confirming site of surgery and surgery  to be performed.    A 1-cm transverse incision was made to the ulnar aspect of the palmaris longus at proximal wrist crease. Skin and underlying tissues were sharply dissected down. Hemostasis maintained with bipolar electrocauterization. Distally based flap of the fascia  overlying the  median nerve was then released, and under direct visualization, proximal fascia was released with Littler scissors. The elevator and dilator were then placed in the carpal tunnel with appropriate ease of passage and corrugated feel of the  undersurface of transcarpal ligament. The endoscope was then placed under direct visualization. The transverse carpal ligament was released in its entirety, confirmed both visually as well as by utilizing endoscope as a probe, which freely passed following  release. The nerve was evaluated. No evidence of lesion or adhesion was present.    The wound was irrigated with sterile saline, closed with 5-0 nylon suture. Sterile dressing was then placed of Xeroform and 4x4s affixed with Kerlix and Ace wrap. Tourniquet was deflated, and the patient was taken to PACU in satisfactory condition.    Electronically signed  Larry Mead DO     Assessment and Plan:   Code Status:  ·  Code Status Full Code     Advance Care Planning:  Advance Care Planning: Patient didn't wish to or  was unable to provide advance care plan       Electronic Signatures:  STEFAN Mead James ()  (Signed 29-Jun-2023 14:38)   Authored: Service, Subjective Data, Objective Data, Assessment  and Plan, Note Completion      Last Updated: 29-Jun-2023 14:38 by STEFAN Mead James ()

## 2023-10-03 ENCOUNTER — PATIENT MESSAGE (OUTPATIENT)
Dept: GASTROENTEROLOGY | Facility: CLINIC | Age: 73
End: 2023-10-03
Payer: MEDICARE

## 2023-10-17 ENCOUNTER — APPOINTMENT (OUTPATIENT)
Dept: GASTROENTEROLOGY | Facility: CLINIC | Age: 73
End: 2023-10-17
Payer: MEDICARE

## 2023-10-17 ENCOUNTER — OFFICE VISIT (OUTPATIENT)
Dept: GASTROENTEROLOGY | Facility: CLINIC | Age: 73
End: 2023-10-17
Payer: MEDICARE

## 2023-10-17 VITALS
BODY MASS INDEX: 36.37 KG/M2 | WEIGHT: 240 LBS | DIASTOLIC BLOOD PRESSURE: 70 MMHG | HEIGHT: 68 IN | SYSTOLIC BLOOD PRESSURE: 118 MMHG | HEART RATE: 60 BPM

## 2023-10-17 DIAGNOSIS — Z12.12 SCREENING FOR COLORECTAL CANCER: Primary | ICD-10-CM

## 2023-10-17 DIAGNOSIS — Z12.11 COLON CANCER SCREENING: ICD-10-CM

## 2023-10-17 DIAGNOSIS — Z12.11 SCREENING FOR COLORECTAL CANCER: Primary | ICD-10-CM

## 2023-10-17 PROCEDURE — 3074F SYST BP LT 130 MM HG: CPT | Performed by: NURSE PRACTITIONER

## 2023-10-17 PROCEDURE — 1160F RVW MEDS BY RX/DR IN RCRD: CPT | Performed by: NURSE PRACTITIONER

## 2023-10-17 PROCEDURE — 99203 OFFICE O/P NEW LOW 30 MIN: CPT | Performed by: NURSE PRACTITIONER

## 2023-10-17 PROCEDURE — 1159F MED LIST DOCD IN RCRD: CPT | Performed by: NURSE PRACTITIONER

## 2023-10-17 PROCEDURE — 1126F AMNT PAIN NOTED NONE PRSNT: CPT | Performed by: NURSE PRACTITIONER

## 2023-10-17 PROCEDURE — 3008F BODY MASS INDEX DOCD: CPT | Performed by: NURSE PRACTITIONER

## 2023-10-17 PROCEDURE — 1036F TOBACCO NON-USER: CPT | Performed by: NURSE PRACTITIONER

## 2023-10-17 PROCEDURE — 3078F DIAST BP <80 MM HG: CPT | Performed by: NURSE PRACTITIONER

## 2023-10-17 RX ORDER — POLYETHYLENE GLYCOL 3350, SODIUM SULFATE ANHYDROUS, SODIUM BICARBONATE, SODIUM CHLORIDE, POTASSIUM CHLORIDE 236; 22.74; 6.74; 5.86; 2.97 G/4L; G/4L; G/4L; G/4L; G/4L
4000 POWDER, FOR SOLUTION ORAL ONCE
Qty: 4000 ML | Refills: 0 | Status: SHIPPED | OUTPATIENT
Start: 2023-10-17 | End: 2023-10-17

## 2023-10-17 NOTE — PATIENT INSTRUCTIONS
"The diagnosis and evaluation options were discussed with the patient. A colonoscopy was recommended. The procedure and sedation were discussed. Risks including but not limited to bleeding, perforation, reaction to medication, missed polyps, damage to other organs, and adverse cardiopulmonary events were discussed. The patient's questions were answered. Patient was instructed about not driving the day of the exam after being sedated.   Prior to scheduling any upcoming endoscopy procedure(s), it is necessary to obtain perioperative evaluation (Cardiac Clearance / cardiac risk assessment). It is also necessary to obtain clearance to hold the following anticoagulation/antiplatelet therapy: Clopidogrel for 5 days prior to endoscopy. Perioperative risk evaluation may require additional testing and/or recommendations concerning the evaluation, management, and risk of cardiac problems over the perioperative period. The office will fax the request for \"Cardiac Clearance\" to your provider. The office will then contact you regarding scheduling the endoscopy procedure(s) once the determination has been received. Cardiac clearance to be sent to: Dr. Tru Guerrero.  Golytely bowel prep to be sent to pharmacy.  Limit/avoid triggering foods/beverages for diarrhea.  Follow-up office visit as needed.  "

## 2023-10-17 NOTE — PROGRESS NOTES
Subjective   Patient ID: Rommel Thompson is a 72 y.o. male Year old male with past medical history of CAD, cardiomyopathy, HTN, HLD, ALVIN on CPAP, prediabetes, TIA, long-term use of Plavix, BPH, GERD, osteoarthritiswas referred by Dr. Pilo Marquez for OA fail blood thinner (Referred by Dr. Marquez).    Patient's PCP is Pilo Marquez  Cardiology - Tru Guerrero    HPI  Patient is not sure if he has had previous colonoscopy. He denies family history of colon cancer. He denies abdominal pain. Bowel movements are typically regularly formed daily, except he does experience urgent diarrhea about 15 minutes after drinking a cappuccino or latte. He denies diarrhea when consuming other products with dairy. He denies constipation, rectal bleeding, blood in the stool or melena. He denies heartburn, dysphagia, nausea, vomiting, changes in appetite or unexplained weight loss.     Prior GI evaluation:  None to review.    Review of Systems:  Const: No reported fatigue, fever and unintentional weight loss.  CV: Reported history CAD, HTN, HLD, CABG. No reported chest pain, pacemaker, palpitations and valvular heart disease.  Resp: Reported sleep apnea and CPAP. No reported cough, SOB and snoring.  GI: No reported symptoms other than stated in HPI.  Musculo: No reported joint pain and muscle pain.  Skin: No reported hives and rash.  Neuro: Reported TIA. No reported seizures.  Psych: No reported anxiety and depression.  Endocrine: No reported intolerance to cold, hot flashes and impaired glucose tolerance.  Hema/Lymph: No reported anemia, blood transfusions, chemotherapy, enlarged lymph nodes and radiation treatment of any kind.     Medications:  Prior to Admission medications    Medication Sig Start Date End Date Taking? Authorizing Provider   acetaminophen (Tylenol) 325 mg tablet Take 1 tablet (325 mg) by mouth every 4 hours. 7/25/17  Yes Historical Provider, MD   aspirin 81 mg EC tablet Take 1 tablet (81 mg) by mouth once daily.   Yes  Historical Provider, MD   atorvastatin (Lipitor) 40 mg tablet Take 1 tablet (40 mg) by mouth once daily. 9/7/23  Yes Pilo Marquez MD   carvedilol (Coreg) 25 mg tablet Take 1 tablet (25 mg) by mouth 2 times a day. 9/7/23  Yes Pilo Marquez MD   clopidogrel (Plavix) 75 mg tablet Take 1 tablet (75 mg) by mouth once daily. 6/30/23  Yes Pilo Marquez MD   cyanocobalamin, vitamin B-12, (Vitamin B-12) 1,000 mcg tablet extended release  12/2/22  Yes Historical Provider, MD   losartan (Cozaar) 50 mg tablet Take 1 tablet (50 mg) by mouth once daily. 9/7/23  Yes Pilo Marquez MD   metFORMIN (Glucophage) 1,000 mg tablet Take 1 tablet (1,000 mg) by mouth 2 times a day. 9/7/23  Yes Pilo Marquez MD   multivitamin with minerals (multivit-min-iron fum-folic ac) tablet Take 1 tablet by mouth once daily. 7/25/17  Yes Historical Provider, MD   tamsulosin (Flomax) 0.4 mg 24 hr capsule Take 1 capsule (0.4 mg) by mouth once daily. 7/31/23 7/30/24 Yes Shawn Sharma MD   polyethylene glycol 236-22.74-6.74 -5.86 gram solution Take 4,000 mL by mouth 1 time for 1 dose. Take as directed in instructions from office. 10/17/23 10/17/23  ANNIKA Wilkinson-CNP       Allergies:  Sulfamethoxazole-trimethoprim    Past Medical History:  He has a past medical history of Bilateral primary osteoarthritis of knee (06/28/2022), Carpal tunnel syndrome, left upper limb (01/06/2023), Essential (primary) hypertension (08/09/2022), Gastro-esophageal reflux disease without esophagitis (06/28/2022), Hyperlipidemia, unspecified (08/09/2022), Male erectile dysfunction, unspecified (08/21/2016), Morbid (severe) obesity due to excess calories (CMS/[facility]) (08/09/2022), Obstructive sleep apnea (adult) (pediatric) (09/08/2022), Other conditions influencing health status (08/21/2016), Personal history of other diseases of the circulatory system, Personal history of other endocrine, nutritional and metabolic disease (08/21/2016), Personal history  of urinary (tract) infections (06/29/2016), and Prediabetes (08/09/2022).    Past Surgical History:  He has a past surgical history that includes Total knee arthroplasty (05/12/2016); Gastric bypass (07/17/2017); Other surgical history (07/17/2017); Coronary artery bypass graft (08/08/2017); CT angio head w and wo IV contrast (8/24/2017); and CT angio neck w and wo IV contrast (8/24/2017).    Social History:  He reports that he quit smoking about 22 years ago. His smoking use included cigarettes. He smoked an average of 1 pack per day. He has never used smokeless tobacco. He reports current alcohol use of about 14.0 standard drinks of alcohol per week. He reports that he does not use drugs.    Objective   Physical exam:  Const: Vital signs reviewed. Patient appears healthy, well-developed and in no apparent distress.  Eyes: PERRL. Sclera white; conjunctiva pink and moist.  Mouth/throat: mucous membranes pink and moist; teeth and gums in generally good condition.  Neck: supple, non-tender; trachea midline, no thyromegaly or lymphadenopathy .  Resp: Respiration rate is normal. Clear to auscultation.  CV: Rhythm is regular. S1 S2, no extra sounds or murmurs. No peripheral edema.  Abdomen: Positive bowel sounds in all quadrants. No bruits. Normal to percussion. Palpation of the abdomen reveals softness but no tenderness, distension or fluid wave. No abdominal masses. No hernias. No palpable hepatosplenomegaly.  Anus/Perineum/Rectum: Exam deferred.  MS: steady, stable gait. No deformities.  Skin: Dry and warm with no nodularity, rash, lesions, or jaundice.  Psych: Affect is normal and apprpopriate. Alert and oriented x3.  Neuro: alert and oriented x3.     Assessment/Plan   Problem List Items Addressed This Visit             ICD-10-CM    Colon cancer screening Z12.11     The diagnosis and evaluation options were discussed with the patient. A colonoscopy was recommended. The procedure and sedation were discussed. Risks  "including but not limited to bleeding, perforation, reaction to medication, missed polyps, damage to other organs, and adverse cardiopulmonary events were discussed. The patient's questions were answered. Patient was instructed about not driving the day of the exam after being sedated.   Prior to scheduling any upcoming endoscopy procedure(s), it is necessary to obtain perioperative evaluation (Cardiac Clearance / cardiac risk assessment). It is also necessary to obtain clearance to hold the following anticoagulation/antiplatelet therapy: Clopidogrel for 5 days prior to endoscopy. Perioperative risk evaluation may require additional testing and/or recommendations concerning the evaluation, management, and risk of cardiac problems over the perioperative period. The office will fax the request for \"Cardiac Clearance\" to your provider. The office will then contact you regarding scheduling the endoscopy procedure(s) once the determination has been received. Cardiac clearance to be sent to: Dr. Tru Guerrero.  Milly bowel prep to be sent to pharmacy.          Other Visit Diagnoses         Codes    Screening for colorectal cancer    -  Primary Z12.11, Z12.12    Relevant Medications    polyethylene glycol 236-22.74-6.74 -5.86 gram solution    Other Relevant Orders    Colonoscopy Screening               Follow-up in the office as needed.    Milla Rainey, APRN-CNP   "

## 2023-10-17 NOTE — ASSESSMENT & PLAN NOTE
"The diagnosis and evaluation options were discussed with the patient. A colonoscopy was recommended. The procedure and sedation were discussed. Risks including but not limited to bleeding, perforation, reaction to medication, missed polyps, damage to other organs, and adverse cardiopulmonary events were discussed. The patient's questions were answered. Patient was instructed about not driving the day of the exam after being sedated.   Prior to scheduling any upcoming endoscopy procedure(s), it is necessary to obtain perioperative evaluation (Cardiac Clearance / cardiac risk assessment). It is also necessary to obtain clearance to hold the following anticoagulation/antiplatelet therapy: Clopidogrel for 5 days prior to endoscopy. Perioperative risk evaluation may require additional testing and/or recommendations concerning the evaluation, management, and risk of cardiac problems over the perioperative period. The office will fax the request for \"Cardiac Clearance\" to your provider. The office will then contact you regarding scheduling the endoscopy procedure(s) once the determination has been received. Cardiac clearance to be sent to: Dr. Tru Guerrero.  Milly bowel prep to be sent to pharmacy.  "

## 2023-10-25 ENCOUNTER — DOCUMENTATION (OUTPATIENT)
Dept: CARDIOLOGY | Facility: CLINIC | Age: 73
End: 2023-10-25
Payer: MEDICARE

## 2023-10-25 NOTE — PROGRESS NOTES
"Request from  [facility] for EGD and/or colonoscopy    Request for Plavix to be held 5-7 days before procedure    Diagnosis/what are we seeing for:  PMH is significant for HTN, CAD s/p CABG , Right CEA , CMP, GERD, hyperlipidemia, ALVIN (CPAP), h/o TIA and vocal cord paralysis.     Last ischemic work up left heart cath  Date: 2017  **Per 22 office note \"Patient states that he did have a stress test and echo a year or so ago but does not have access to the results\". This was completed in [place].    Anticoagulation:  N/A  Antiplatelet: ASA and Clopidogrel (Plavix)  Has patient had a recent cardioversion or ablation?  no     Last seen by Dr. Guerrero 23    Next appointment:  23 w/ Dr. Guerrero                       [address] [address]                   Phone# [phone]              Fax# [phone]      Date: 10/25/23    RE: Rommel Thompson            : 1950       Surgical/Procedural Clearance for:  EGD/Colonoscopy  Patient is at: LOW cardiovascular risk for this LOW risk procedure.           Can hold Antiplatelet Clopidogrel (Plavix) for 3 days prior      N/A hold Anticoagulant    Is further cardiac workup is needed prior to the procedure?  No     Patient should continue Beta Blocker in the perioperative period.  N/A     Patient should resume antiplatelet/anticoagulation as soon as cleared by surgeon/procedure physician.  Yes       Thank You,    10/25/23 at 10:23 AM - Tru Guerrero MD                "

## 2023-11-08 ENCOUNTER — TELEPHONE (OUTPATIENT)
Dept: GASTROENTEROLOGY | Facility: CLINIC | Age: 73
End: 2023-11-08
Payer: MEDICARE

## 2023-11-08 NOTE — TELEPHONE ENCOUNTER
----- Message from MARIBEL Wilkinson sent at 2023  8:06 AM EST -----  Regarding: RE: Cardiac Clearance  He can be scheduled in Endo. Thanks.  ----- Message -----  From: Jessy Roman MA  Sent: 2023   9:33 AM EST  To: MARIBEL Wilkinson  Subject: RE: Cardiac Clearance                            I apologize but would please advise on OR or Endo? Office note does not advise location. Thank you  ----- Message -----  From: MARIBEL Wilkinson  Sent: 2023   9:06 AM EST  To: Jessy Roman MA  Subject: RE: Cardiac Clearance                            Endoscopy can be scheduled however per cardiac clearance note, he can only hold Plavix for 3 days prior to endoscopy.   Thanks,  Milla  ----- Message -----  From: Jessy Roman MA  Sent: 2023   3:10 PM EST  To: MARIBEL Wilkinson  Subject: RE: Cardiac Clearance                            Please review and ok to schedule.  ----- Message -----  From: Jessy Roman MA  Sent: 2023   3:09 PM EDT  To: Jessy Roman MA; #  Subject: RE: Cardiac Clearance                            Cardiac clearance is in the Patient's chart under an office note. May hold Plavix 5 days  ----- Message -----  From: Jessy Roman MA  Sent: 10/24/2023   9:55 AM EDT  To: Jessy Roman MA  Subject: RE: Cardiac Clearance                            Colon  ENDO  Liam Golytely Prep given    ----- Message -----  From: Jessy Roman MA  Sent: 10/24/2023   9:55 AM EDT  To:  [id number] Clinical Support Staff; #  Subject: Cardiac Clearance                                  Dr. Guerrero,      Your patient, Rommel Thompson (: 1950), is being scheduled for an endoscopic procedure (EGD and/or colonoscopy). They are currently taking an anticoagulant or antiplatelet medication that is being managed by your office. Prior to scheduling the procedure we need to know if it is okay for the patient to temporarily hold this medication for their  procedure.    Low dose Aspirin (81 mg per day) is considered safe for endoscopic procedures and should be continued through the day of their procedure.      Current Anticoagulation: Plavix    This medication would need to be held starting the following number of days before the procedure(s).      Plavix (Clopidogrel): 5-7 days before procedure      Please Respond to this Message at your Earliest Convenience (for ease you may copy and paste one of the following responses into your reply)      _______  It is CURRENTLY OKAY (safe) for this patient to temporarily hold the above medication for the duration listed.      _______  It is okay (safe) for this patient to temporarily hold the above medication, BUT ONLY for ______ days prior to the planned procedure      _______  If it is NOT currently safe for the patient to temporarily hold this medication, please indicate how long they would need to wait until it might be safe to hold it.          Patients are typically able to restart these medications the day after the procedure(s). If there are specific instructions for restarting this medication (i.e. dose titration) that you would like your patient to follow, please contact them directly.      If you have any questions or need additional information please reply to this message or call our office at [phone].      Thank you.        Jessy Roman  Medical Assistant      /[facility]    [address] [address]    Phone: [phone]  Fax: [phone]

## 2023-11-08 NOTE — TELEPHONE ENCOUNTER
H:  5'7.5  W: 240  BMI: 37.03    ON NOTE LINE ALONG WITH OTHER INFORMATION PLEASE INCLUDE: SCREENING COLONOSCOPY      Left message on machine to return call

## 2023-12-01 ENCOUNTER — ANESTHESIA (OUTPATIENT)
Dept: GASTROENTEROLOGY | Facility: HOSPITAL | Age: 73
End: 2023-12-01
Payer: MEDICARE

## 2023-12-01 ENCOUNTER — HOSPITAL ENCOUNTER (OUTPATIENT)
Dept: GASTROENTEROLOGY | Facility: HOSPITAL | Age: 73
Discharge: HOME | End: 2023-12-01
Payer: MEDICARE

## 2023-12-01 ENCOUNTER — ANESTHESIA EVENT (OUTPATIENT)
Dept: GASTROENTEROLOGY | Facility: HOSPITAL | Age: 73
End: 2023-12-01
Payer: MEDICARE

## 2023-12-01 VITALS
BODY MASS INDEX: 37.77 KG/M2 | OXYGEN SATURATION: 93 % | HEIGHT: 66 IN | HEART RATE: 53 BPM | TEMPERATURE: 97.7 F | SYSTOLIC BLOOD PRESSURE: 138 MMHG | RESPIRATION RATE: 16 BRPM | DIASTOLIC BLOOD PRESSURE: 97 MMHG | WEIGHT: 235 LBS

## 2023-12-01 DIAGNOSIS — Z12.12 SCREENING FOR COLORECTAL CANCER: ICD-10-CM

## 2023-12-01 DIAGNOSIS — Z12.11 SCREENING FOR COLORECTAL CANCER: ICD-10-CM

## 2023-12-01 PROCEDURE — 2500000005 HC RX 250 GENERAL PHARMACY W/O HCPCS: Performed by: NURSE ANESTHETIST, CERTIFIED REGISTERED

## 2023-12-01 PROCEDURE — G0121 COLON CA SCRN NOT HI RSK IND: HCPCS | Performed by: INTERNAL MEDICINE

## 2023-12-01 PROCEDURE — 2500000004 HC RX 250 GENERAL PHARMACY W/ HCPCS (ALT 636 FOR OP/ED): Performed by: NURSE ANESTHETIST, CERTIFIED REGISTERED

## 2023-12-01 PROCEDURE — 3700000001 HC GENERAL ANESTHESIA TIME - INITIAL BASE CHARGE: Performed by: INTERNAL MEDICINE

## 2023-12-01 PROCEDURE — 7100000009 HC PHASE TWO TIME - INITIAL BASE CHARGE: Performed by: INTERNAL MEDICINE

## 2023-12-01 PROCEDURE — 2500000004 HC RX 250 GENERAL PHARMACY W/ HCPCS (ALT 636 FOR OP/ED): Performed by: INTERNAL MEDICINE

## 2023-12-01 PROCEDURE — 7100000010 HC PHASE TWO TIME - EACH INCREMENTAL 1 MINUTE: Performed by: INTERNAL MEDICINE

## 2023-12-01 PROCEDURE — 3700000002 HC GENERAL ANESTHESIA TIME - EACH INCREMENTAL 1 MINUTE: Performed by: INTERNAL MEDICINE

## 2023-12-01 PROCEDURE — 45378 DIAGNOSTIC COLONOSCOPY: CPT | Performed by: INTERNAL MEDICINE

## 2023-12-01 RX ORDER — FENTANYL CITRATE 50 UG/ML
INJECTION, SOLUTION INTRAMUSCULAR; INTRAVENOUS AS NEEDED
Status: DISCONTINUED | OUTPATIENT
Start: 2023-12-01 | End: 2023-12-01

## 2023-12-01 RX ORDER — LIDOCAINE HYDROCHLORIDE 20 MG/ML
INJECTION, SOLUTION INFILTRATION; PERINEURAL AS NEEDED
Status: DISCONTINUED | OUTPATIENT
Start: 2023-12-01 | End: 2023-12-01

## 2023-12-01 RX ORDER — SODIUM CHLORIDE 9 MG/ML
20 INJECTION, SOLUTION INTRAVENOUS CONTINUOUS
Status: DISCONTINUED | OUTPATIENT
Start: 2023-12-01 | End: 2023-12-02 | Stop reason: HOSPADM

## 2023-12-01 RX ORDER — PROPOFOL 10 MG/ML
INJECTION, EMULSION INTRAVENOUS AS NEEDED
Status: DISCONTINUED | OUTPATIENT
Start: 2023-12-01 | End: 2023-12-01

## 2023-12-01 RX ADMIN — LIDOCAINE HYDROCHLORIDE 4 ML: 20 INJECTION, SOLUTION INFILTRATION; PERINEURAL at 07:34

## 2023-12-01 RX ADMIN — PROPOFOL 50 MG: 10 INJECTION, EMULSION INTRAVENOUS at 07:46

## 2023-12-01 RX ADMIN — FENTANYL CITRATE 25 MCG: 50 INJECTION, SOLUTION INTRAMUSCULAR; INTRAVENOUS at 07:37

## 2023-12-01 RX ADMIN — PROPOFOL 50 MG: 10 INJECTION, EMULSION INTRAVENOUS at 07:37

## 2023-12-01 RX ADMIN — PROPOFOL 50 MG: 10 INJECTION, EMULSION INTRAVENOUS at 07:52

## 2023-12-01 RX ADMIN — PROPOFOL 50 MG: 10 INJECTION, EMULSION INTRAVENOUS at 07:49

## 2023-12-01 RX ADMIN — PROPOFOL 50 MG: 10 INJECTION, EMULSION INTRAVENOUS at 07:42

## 2023-12-01 RX ADMIN — PROPOFOL 100 MG: 10 INJECTION, EMULSION INTRAVENOUS at 07:34

## 2023-12-01 RX ADMIN — FENTANYL CITRATE 25 MCG: 50 INJECTION, SOLUTION INTRAMUSCULAR; INTRAVENOUS at 07:42

## 2023-12-01 RX ADMIN — PROPOFOL 50 MG: 10 INJECTION, EMULSION INTRAVENOUS at 07:40

## 2023-12-01 RX ADMIN — FENTANYL CITRATE 50 MCG: 50 INJECTION, SOLUTION INTRAMUSCULAR; INTRAVENOUS at 07:34

## 2023-12-01 RX ADMIN — SODIUM CHLORIDE 20 ML/HR: 9 INJECTION, SOLUTION INTRAVENOUS at 07:15

## 2023-12-01 SDOH — HEALTH STABILITY: MENTAL HEALTH: CURRENT SMOKER: 0

## 2023-12-01 ASSESSMENT — PAIN SCALES - GENERAL
PAINLEVEL_OUTOF10: 0 - NO PAIN
PAIN_LEVEL: 0
PAINLEVEL_OUTOF10: 0 - NO PAIN

## 2023-12-01 ASSESSMENT — COLUMBIA-SUICIDE SEVERITY RATING SCALE - C-SSRS
2. HAVE YOU ACTUALLY HAD ANY THOUGHTS OF KILLING YOURSELF?: NO
1. IN THE PAST MONTH, HAVE YOU WISHED YOU WERE DEAD OR WISHED YOU COULD GO TO SLEEP AND NOT WAKE UP?: NO
6. HAVE YOU EVER DONE ANYTHING, STARTED TO DO ANYTHING, OR PREPARED TO DO ANYTHING TO END YOUR LIFE?: NO

## 2023-12-01 ASSESSMENT — PAIN - FUNCTIONAL ASSESSMENT
PAIN_FUNCTIONAL_ASSESSMENT: 0-10
PAIN_FUNCTIONAL_ASSESSMENT: 0-10

## 2023-12-01 NOTE — ANESTHESIA PREPROCEDURE EVALUATION
Patient: Rommel Thompson    Procedure Information       Date/Time: 12/01/23 0730    Scheduled providers: Cesar Alvarado DO    Procedure: COLONOSCOPY    Location:  [facility]            Relevant Problems   Anesthesia (within normal limits)      Cardiovascular   (+) Angina pectoris (CMS/HCC)   (+) Benign essential HTN   (+) CAD (coronary artery disease)   (+) Carotid artery stenosis   (+) HTN (hypertension), benign   (+) Hyperlipemia   (+) Hyperlipidemia   (+) Occlusion of left carotid artery      Endocrine   (+) Severe obesity (BMI 35.0-39.9) with comorbidity (CMS/HCC)      GI   (+) GERD (gastroesophageal reflux disease)      /Renal (within normal limits)      Neuro/Psych   (+) Carotid artery stenosis   (+) Carpal tunnel syndrome of left wrist   (+) Cervical radiculopathy, chronic   (+) Left carpal tunnel syndrome   (+) Occlusion of left carotid artery      Pulmonary   (+) ALVIN on CPAP      GI/Hepatic (within normal limits)      Hematology   (+) Anemia      Musculoskeletal   (+) Carpal tunnel syndrome of left wrist   (+) Left carpal tunnel syndrome   (+) Osteoarthritis of knees, bilateral   (+) Osteoarthritis of left shoulder   (+) Osteoarthritis of right shoulder      Infectious Disease   (+) Respiratory tract infection      Other   (+) Arthritis of both knees       Clinical information reviewed:    Allergies                NPO Detail:  NPO/Void Status  Date of Last Liquid: 12/01/23  Time of Last Liquid: 2100  Date of Last Solid: 11/29/23  Time of Last Solid: 2000  Last Intake Type: GI prep; Clear fluids         Physical Exam    Airway  Mallampati: III  TM distance: >3 FB  Neck ROM: full     Cardiovascular - normal exam  Rhythm: regular  Rate: normal     Dental - normal exam     Pulmonary - normal exam     Abdominal - normal exam             Anesthesia Plan    ASA 3     MAC     The patient is not a current smoker.    intravenous induction   Anesthetic plan and risks discussed with  patient.

## 2023-12-01 NOTE — ANESTHESIA POSTPROCEDURE EVALUATION
Patient: Rommel Thompson    Procedure Summary       Date: 12/01/23 Room / Location: [facility]    Anesthesia Start: 0725 Anesthesia Stop: 0800    Procedure: COLONOSCOPY Diagnosis: Screening for colorectal cancer    Scheduled Providers: Cesar Alvarado DO Responsible Provider: HOLLIE Armstrong    Anesthesia Type: MAC ASA Status: 3            Anesthesia Type: MAC    Vitals Value Taken Time   /57 12/01/23 0800   Temp 36.4 °C (97.6 °F) 12/01/23 0800   Pulse 57 12/01/23 0800   Resp 16 12/01/23 0800   SpO2 92 % 12/01/23 0800       Anesthesia Post Evaluation    Patient location during evaluation: bedside  Patient participation: complete - patient participated  Level of consciousness: awake and alert  Pain score: 0  Pain management: adequate  Airway patency: patent  Cardiovascular status: acceptable  Respiratory status: acceptable  Hydration status: acceptable  Postoperative Nausea and Vomiting: none        There were no known notable events for this encounter.

## 2023-12-01 NOTE — H&P
History Of Present Illness  Rommel Thompson is a 72 y.o. male presenting for screening colon     Plavix has been held for      Past Medical History  Past Medical History:   Diagnosis Date    Bilateral primary osteoarthritis of knee 06/28/2022    Osteoarthritis of knees, bilateral    Carpal tunnel syndrome, left upper limb 01/06/2023    Carpal tunnel syndrome of left wrist    Essential (primary) hypertension 08/09/2022    Benign essential HTN    Gastro-esophageal reflux disease without esophagitis 06/28/2022    GERD (gastroesophageal reflux disease)    Hyperlipidemia, unspecified 08/09/2022    Hyperlipemia    Male erectile dysfunction, unspecified 08/21/2016    Erectile dysfunction    Morbid (severe) obesity due to excess calories (CMS/[facility]) 08/09/2022    Class 2 severe obesity with serious comorbidity and body mass index (BMI) of 37.0 to 37.9 in adult, unspecified obesity type    Obstructive sleep apnea (adult) (pediatric) 09/08/2022    ALVIN on CPAP    Other conditions influencing health status 08/21/2016    Normal echocardiogram    Personal history of other diseases of the circulatory system     History of hypertension    Personal history of other endocrine, nutritional and metabolic disease 08/21/2016    History of diabetes mellitus    Personal history of urinary (tract) infections 06/29/2016    History of urinary tract infection    Prediabetes 08/09/2022    Prediabetes       Surgical History  Past Surgical History:   Procedure Laterality Date    CORONARY ARTERY BYPASS GRAFT  08/08/2017    CABG    CT ANGIO NECK W  8/24/2017    CT NECK ANGIO W AND WO IV CONTRAST 8/24/2017 [facility]    CT HEAD ANGIO W AND WO IV CONTRAST  8/24/2017    CT HEAD ANGIO W AND WO IV CONTRAST 8/24/2017 [facility]    GASTRIC BYPASS  07/17/2017    High Gastric Bypass    OTHER SURGICAL HISTORY  07/17/2017    Carotid Artery Catheterization    TOTAL KNEE ARTHROPLASTY  05/12/2016    Knee Replacement        Social History  He  "reports that he quit smoking about 22 years ago. His smoking use included cigarettes. He smoked an average of 1 pack per day. He has never used smokeless tobacco. He reports current alcohol use of about 14.0 standard drinks of alcohol per week. He reports that he does not use drugs.    Family History  Family History   Problem Relation Name Age of Onset    No Known Problems Mother      No Known Problems Father          Allergies  Sulfamethoxazole-trimethoprim    Review of Systems     Physical Exam  Constitutional:       Appearance: Normal appearance.   HENT:      Mouth/Throat:      Mouth: Mucous membranes are moist.   Eyes:      Extraocular Movements: Extraocular movements intact.   Cardiovascular:      Rate and Rhythm: Normal rate and regular rhythm.      Heart sounds: Normal heart sounds.   Pulmonary:      Effort: Pulmonary effort is normal. No respiratory distress.      Breath sounds: Normal breath sounds. No wheezing.   Abdominal:      General: Abdomen is flat. Bowel sounds are normal. There is no distension.      Palpations: Abdomen is soft.      Tenderness: There is no abdominal tenderness. There is no rebound.   Musculoskeletal:         General: Normal range of motion.   Skin:     General: Skin is warm and dry.   Neurological:      General: No focal deficit present.      Mental Status: He is alert and oriented to person, place, and time. Mental status is at baseline.   Psychiatric:         Mood and Affect: Mood normal.         Behavior: Behavior normal.          Last Recorded Vitals  Blood pressure 148/86, pulse 56, temperature 36.6 °C (97.9 °F), resp. rate 20, height 1.676 m (5' 6\"), weight 107 kg (235 lb).    Relevant Results        Current Outpatient Medications   Medication Instructions    acetaminophen (TYLENOL) 325 mg, oral, Every 4 hours RT    aspirin 81 mg, oral, Daily    atorvastatin (LIPITOR) 40 mg, oral, Daily    carvedilol (COREG) 25 mg, oral, 2 times daily    clopidogrel (PLAVIX) 75 mg, oral, " Daily    cyanocobalamin, vitamin B-12, (Vitamin B-12) 1,000 mcg tablet extended release     losartan (COZAAR) 50 mg, oral, Daily    metFORMIN (GLUCOPHAGE) 1,000 mg, oral, 2 times daily    multivitamin with minerals (multivit-min-iron fum-folic ac) tablet 1 tablet, oral, Daily    tamsulosin (FLOMAX) 0.4 mg, oral, Daily         Assessment/Plan     CRC screen   - Plavix has been held for 5 days prior   - screening colon for evaluation           Cesar Alvarado DO

## 2023-12-04 NOTE — ADDENDUM NOTE
Encounter addended by: Carola Eldridge RN on: 12/4/2023 2:04 PM   Actions taken: Contacts section saved, Flowsheet accepted

## 2023-12-18 DIAGNOSIS — R10.9 ACUTE RIGHT FLANK PAIN: ICD-10-CM

## 2023-12-18 DIAGNOSIS — R35.0 URINARY FREQUENCY: ICD-10-CM

## 2023-12-18 DIAGNOSIS — I65.29 STENOSIS OF CAROTID ARTERY, UNSPECIFIED LATERALITY: ICD-10-CM

## 2023-12-19 ENCOUNTER — PATIENT MESSAGE (OUTPATIENT)
Dept: PRIMARY CARE | Facility: CLINIC | Age: 73
End: 2023-12-19
Payer: MEDICARE

## 2023-12-19 RX ORDER — TAMSULOSIN HYDROCHLORIDE 0.4 MG/1
0.4 CAPSULE ORAL DAILY
Qty: 90 CAPSULE | Refills: 0 | Status: SHIPPED | OUTPATIENT
Start: 2023-12-19 | End: 2024-03-18

## 2023-12-19 RX ORDER — CLOPIDOGREL BISULFATE 75 MG/1
75 TABLET ORAL DAILY
Qty: 90 TABLET | Refills: 1 | Status: SHIPPED | OUTPATIENT
Start: 2023-12-19

## 2024-03-11 DIAGNOSIS — Z95.1 S/P CABG (CORONARY ARTERY BYPASS GRAFT): ICD-10-CM

## 2024-03-11 DIAGNOSIS — E78.5 HYPERLIPIDEMIA, UNSPECIFIED HYPERLIPIDEMIA TYPE: ICD-10-CM

## 2024-03-12 ENCOUNTER — APPOINTMENT (OUTPATIENT)
Dept: PRIMARY CARE | Facility: CLINIC | Age: 74
End: 2024-03-12
Payer: MEDICARE

## 2024-03-12 RX ORDER — ATORVASTATIN CALCIUM 40 MG/1
40 TABLET, FILM COATED ORAL DAILY
Qty: 90 TABLET | Refills: 0 | Status: SHIPPED | OUTPATIENT
Start: 2024-03-12 | End: 2024-04-11 | Stop reason: SDUPTHER

## 2024-03-12 RX ORDER — CARVEDILOL 25 MG/1
25 TABLET ORAL 2 TIMES DAILY
Qty: 180 TABLET | Refills: 0 | Status: SHIPPED | OUTPATIENT
Start: 2024-03-12 | End: 2024-04-11 | Stop reason: SDUPTHER

## 2024-03-18 DIAGNOSIS — R73.03 PREDIABETES: ICD-10-CM

## 2024-03-18 DIAGNOSIS — R35.0 URINARY FREQUENCY: ICD-10-CM

## 2024-03-18 DIAGNOSIS — R10.9 ACUTE RIGHT FLANK PAIN: ICD-10-CM

## 2024-03-18 DIAGNOSIS — I10 HTN (HYPERTENSION), BENIGN: Chronic | ICD-10-CM

## 2024-03-18 RX ORDER — METFORMIN HYDROCHLORIDE 1000 MG/1
1000 TABLET ORAL 2 TIMES DAILY
Qty: 180 TABLET | Refills: 0 | Status: SHIPPED | OUTPATIENT
Start: 2024-03-18 | End: 2024-04-11 | Stop reason: SDUPTHER

## 2024-03-18 RX ORDER — LOSARTAN POTASSIUM 50 MG/1
50 TABLET ORAL DAILY
Qty: 90 TABLET | Refills: 0 | Status: SHIPPED | OUTPATIENT
Start: 2024-03-18 | End: 2024-04-11 | Stop reason: SDUPTHER

## 2024-03-18 RX ORDER — TAMSULOSIN HYDROCHLORIDE 0.4 MG/1
0.4 CAPSULE ORAL DAILY
Qty: 90 CAPSULE | Refills: 0 | Status: SHIPPED | OUTPATIENT
Start: 2024-03-18 | End: 2024-04-11 | Stop reason: SDUPTHER

## 2024-03-19 ENCOUNTER — APPOINTMENT (OUTPATIENT)
Dept: PRIMARY CARE | Facility: CLINIC | Age: 74
End: 2024-03-19
Payer: MEDICARE

## 2024-04-11 ENCOUNTER — OFFICE VISIT (OUTPATIENT)
Dept: PRIMARY CARE | Facility: CLINIC | Age: 74
End: 2024-04-11
Payer: MEDICARE

## 2024-04-11 VITALS
HEART RATE: 55 BPM | OXYGEN SATURATION: 95 % | SYSTOLIC BLOOD PRESSURE: 120 MMHG | WEIGHT: 234 LBS | TEMPERATURE: 96.3 F | BODY MASS INDEX: 37.77 KG/M2 | DIASTOLIC BLOOD PRESSURE: 70 MMHG

## 2024-04-11 DIAGNOSIS — R73.03 PREDIABETES: ICD-10-CM

## 2024-04-11 DIAGNOSIS — M25.522 LEFT ELBOW PAIN: ICD-10-CM

## 2024-04-11 DIAGNOSIS — Z95.1 S/P CABG (CORONARY ARTERY BYPASS GRAFT): ICD-10-CM

## 2024-04-11 DIAGNOSIS — G89.29 CHRONIC PAIN OF BOTH SHOULDERS: ICD-10-CM

## 2024-04-11 DIAGNOSIS — R35.0 URINARY FREQUENCY: ICD-10-CM

## 2024-04-11 DIAGNOSIS — I42.9 CARDIOMYOPATHY, UNSPECIFIED TYPE (MULTI): ICD-10-CM

## 2024-04-11 DIAGNOSIS — M25.511 CHRONIC PAIN OF BOTH SHOULDERS: ICD-10-CM

## 2024-04-11 DIAGNOSIS — E66.01 SEVERE OBESITY (BMI 35.0-39.9) WITH COMORBIDITY (MULTI): ICD-10-CM

## 2024-04-11 DIAGNOSIS — M25.512 CHRONIC PAIN OF BOTH SHOULDERS: ICD-10-CM

## 2024-04-11 DIAGNOSIS — R10.9 ACUTE RIGHT FLANK PAIN: ICD-10-CM

## 2024-04-11 DIAGNOSIS — E78.5 HYPERLIPIDEMIA, UNSPECIFIED HYPERLIPIDEMIA TYPE: ICD-10-CM

## 2024-04-11 DIAGNOSIS — I10 HTN (HYPERTENSION), BENIGN: Primary | Chronic | ICD-10-CM

## 2024-04-11 DIAGNOSIS — Z12.5 SCREENING FOR PROSTATE CANCER: ICD-10-CM

## 2024-04-11 PROBLEM — I20.9 ANGINA PECTORIS (CMS-HCC): Status: RESOLVED | Noted: 2023-05-09 | Resolved: 2024-04-11

## 2024-04-11 PROCEDURE — 1159F MED LIST DOCD IN RCRD: CPT | Performed by: STUDENT IN AN ORGANIZED HEALTH CARE EDUCATION/TRAINING PROGRAM

## 2024-04-11 PROCEDURE — 99215 OFFICE O/P EST HI 40 MIN: CPT | Performed by: STUDENT IN AN ORGANIZED HEALTH CARE EDUCATION/TRAINING PROGRAM

## 2024-04-11 PROCEDURE — 1125F AMNT PAIN NOTED PAIN PRSNT: CPT | Performed by: STUDENT IN AN ORGANIZED HEALTH CARE EDUCATION/TRAINING PROGRAM

## 2024-04-11 PROCEDURE — 1157F ADVNC CARE PLAN IN RCRD: CPT | Performed by: STUDENT IN AN ORGANIZED HEALTH CARE EDUCATION/TRAINING PROGRAM

## 2024-04-11 PROCEDURE — 3008F BODY MASS INDEX DOCD: CPT | Performed by: STUDENT IN AN ORGANIZED HEALTH CARE EDUCATION/TRAINING PROGRAM

## 2024-04-11 PROCEDURE — 3074F SYST BP LT 130 MM HG: CPT | Performed by: STUDENT IN AN ORGANIZED HEALTH CARE EDUCATION/TRAINING PROGRAM

## 2024-04-11 PROCEDURE — 1160F RVW MEDS BY RX/DR IN RCRD: CPT | Performed by: STUDENT IN AN ORGANIZED HEALTH CARE EDUCATION/TRAINING PROGRAM

## 2024-04-11 PROCEDURE — 1123F ACP DISCUSS/DSCN MKR DOCD: CPT | Performed by: STUDENT IN AN ORGANIZED HEALTH CARE EDUCATION/TRAINING PROGRAM

## 2024-04-11 PROCEDURE — 3078F DIAST BP <80 MM HG: CPT | Performed by: STUDENT IN AN ORGANIZED HEALTH CARE EDUCATION/TRAINING PROGRAM

## 2024-04-11 PROCEDURE — 1036F TOBACCO NON-USER: CPT | Performed by: STUDENT IN AN ORGANIZED HEALTH CARE EDUCATION/TRAINING PROGRAM

## 2024-04-11 RX ORDER — TAMSULOSIN HYDROCHLORIDE 0.4 MG/1
0.4 CAPSULE ORAL DAILY
Qty: 90 CAPSULE | Refills: 0 | Status: SHIPPED | OUTPATIENT
Start: 2024-04-11

## 2024-04-11 RX ORDER — LOSARTAN POTASSIUM 50 MG/1
50 TABLET ORAL DAILY
Qty: 90 TABLET | Refills: 0 | Status: SHIPPED | OUTPATIENT
Start: 2024-04-11

## 2024-04-11 RX ORDER — MELOXICAM 7.5 MG/1
7.5 TABLET ORAL DAILY PRN
Qty: 30 TABLET | Refills: 1 | Status: SHIPPED | OUTPATIENT
Start: 2024-04-11 | End: 2024-06-10

## 2024-04-11 RX ORDER — ATORVASTATIN CALCIUM 40 MG/1
40 TABLET, FILM COATED ORAL DAILY
Qty: 90 TABLET | Refills: 0 | Status: SHIPPED | OUTPATIENT
Start: 2024-04-11

## 2024-04-11 RX ORDER — CARVEDILOL 25 MG/1
25 TABLET ORAL 2 TIMES DAILY
Qty: 180 TABLET | Refills: 0 | Status: SHIPPED | OUTPATIENT
Start: 2024-04-11

## 2024-04-11 RX ORDER — METFORMIN HYDROCHLORIDE 1000 MG/1
1000 TABLET ORAL 2 TIMES DAILY
Qty: 180 TABLET | Refills: 0 | Status: SHIPPED | OUTPATIENT
Start: 2024-04-11

## 2024-04-11 ASSESSMENT — ENCOUNTER SYMPTOMS
PALPITATIONS: 0
HEADACHES: 0
SHORTNESS OF BREATH: 0
DIZZINESS: 0
FATIGUE: 0
DIARRHEA: 0
ABDOMINAL PAIN: 0
COLOR CHANGE: 0
CONFUSION: 0
FEVER: 0
NAUSEA: 0
COUGH: 0
ARTHRALGIAS: 1
UNEXPECTED WEIGHT CHANGE: 0
CHILLS: 0
CONSTIPATION: 0
WHEEZING: 0
VOMITING: 0

## 2024-04-11 ASSESSMENT — PAIN SCALES - GENERAL: PAINLEVEL: 8

## 2024-04-11 NOTE — PROGRESS NOTES
Subjective   Patient ID: Rommel Thompson is a 73 y.o. male who presents for Follow-up (Pt is here for FUV. Pt says he's had pain in his shoulder, hands, maybe arthritis. ).    HPI   He is here for FU visit. Reports he is having bl shoulder L >R x few months, hearing clicks ROM usually in the mng; and some forearm pain, waking him at night; pain worse with rotation of forearm; occa having difficulty opening milk jar. Reports its worse in the morning; taking tylenol as needed with minimal help.   Also reports he possible spider bite on his nose tip when he was in [place] about 2 mo ago; had scab which came out and now has small dent there; denies pain, drainage, HA and other complaints.     # HTN/HLD # CAD   - io /70  - takes coreg 25 mg bid and losartan 50 mg daily   - takes atorva 40 mg daily & baby ASA      # Prediabetes   - last A1c 5.7 (06/23)  - takes metformin 1000 mg bid    # BPH   - reports his urinary sx are about the same; last PSA (08/23)   - takes flomax 0.4 mg daily     Review of Systems   Constitutional:  Negative for chills, fatigue, fever and unexpected weight change.   HENT: Negative.     Respiratory:  Negative for cough, shortness of breath and wheezing.    Cardiovascular:  Negative for chest pain, palpitations and leg swelling.   Gastrointestinal:  Negative for abdominal pain, constipation, diarrhea, nausea and vomiting.   Musculoskeletal:  Positive for arthralgias.   Skin:  Negative for color change and rash.   Neurological:  Negative for dizziness and headaches.   Psychiatric/Behavioral:  Negative for behavioral problems and confusion.        Objective   /70 (BP Location: Left arm, Patient Position: Sitting, BP Cuff Size: Adult)   Pulse 55   Temp 35.7 °C (96.3 °F)   Wt 106 kg (234 lb)   SpO2 95%   BMI 37.77 kg/m²     Physical Exam  Vitals and nursing note reviewed.   Constitutional:       Appearance: Normal appearance. He is obese.   Cardiovascular:      Rate and Rhythm: Normal rate  and regular rhythm.      Pulses: Normal pulses.      Heart sounds: Normal heart sounds.   Pulmonary:      Effort: Pulmonary effort is normal. No respiratory distress.      Breath sounds: Normal breath sounds.   Abdominal:      General: Abdomen is flat. Bowel sounds are normal.      Palpations: Abdomen is soft.   Musculoskeletal:         General: Normal range of motion.      Comments: Bl shoulder: no asymmetry noted; not ttp, no bruise noted; ROM was sl limited in elevating above the shoulder d/t pain. Neurovasc normal.   L elbow/fore arm mild-mod pain with flexion & extension of the arm.    Skin:     Comments: Nose: has small scratch tutu on the mid of nose bridge, has small sl indented area below the tutu; no tenderness; inside of the nostril appears normal.    Neurological:      General: No focal deficit present.      Mental Status: He is alert and oriented to person, place, and time.   Psychiatric:         Mood and Affect: Mood normal.         Behavior: Behavior normal.       Assessment/Plan   He is here for FU visit. Off note: pt recently came form [facility], was there for 2 mo; travelled annually during winter time.   His shoulder pain likely 2/2 arthritis; will do trial of mobic daily as needed; also start physical therapy; cont stretching exercises at home. Consider referring to ortho if no improvement in pain.   Has small scratch tutu & small indented tutu on his nose bridge otherwise nl exam; adv to keep close eye on it and if any changes to let us know. May try emollient as Aquaphor bid. Other chronic problems been stable; cont same.     # HTN/HLD # CAD   - BP well controlled   - cont coreg 25 mg bid and losartan 50 mg daily   - cont atorva 40 mg daily & baby ASA   - encourage heart healthy & DASH diet; continue exercise as tolerated, at least 150 mins per wk   - BW per emr, will call for any abn results as indicated; pt made aware      # Prediabetes   - last A1c 5.7 (06/23), at goal   - cont metformin  1000 mg bid  - bld work as below     # BPH   - urinary sx overall stable    - cont flomax 0.4 mg daily   - may consider referring to urology if sx worsens   - repeat PSA     Problem List Items Addressed This Visit             ICD-10-CM    Cardiomyopathy (CMS/HCC) I42.9     Stable; cont cardiac meds as usual. Cont following with cards as usual.          Relevant Medications    carvedilol (Coreg) 25 mg tablet    Bilateral shoulder pain M25.511, M25.512    Relevant Medications    meloxicam (Mobic) 7.5 mg tablet    Other Relevant Orders    Referral to Physical Therapy    Prediabetes R73.03    Relevant Medications    metFORMIN (Glucophage) 1,000 mg tablet    Other Relevant Orders    Hemoglobin A1c    S/P CABG (coronary artery bypass graft) Z95.1    Relevant Medications    carvedilol (Coreg) 25 mg tablet    atorvastatin (Lipitor) 40 mg tablet    Other Relevant Orders    Lipid Panel    Severe obesity (BMI 35.0-39.9) with comorbidity (CMS/HCC) E66.01     Adv to follow low calorie diet & daily exercises.          HTN (hypertension), benign - Primary (Chronic) I10    Relevant Medications    losartan (Cozaar) 50 mg tablet    Other Relevant Orders    Comprehensive Metabolic Panel    CBC and Auto Differential    Hyperlipidemia (Chronic) E78.5    Relevant Medications    atorvastatin (Lipitor) 40 mg tablet    Other Relevant Orders    Lipid Panel    Urinary frequency R35.0    Relevant Medications    tamsulosin (Flomax) 0.4 mg 24 hr capsule    Acute right flank pain R10.9    Relevant Medications    tamsulosin (Flomax) 0.4 mg 24 hr capsule     Other Visit Diagnoses         Codes    Screening for prostate cancer     Z12.5    Relevant Orders    Prostate Spec.Ag,Screen    Left elbow pain     M25.522    Relevant Medications    meloxicam (Mobic) 7.5 mg tablet    Other Relevant Orders    Referral to Physical Therapy          Rtc 5-6 mo for MCR/FU    Pilo Marqeuz MD   [facility], Family Medicine

## 2024-05-03 ENCOUNTER — DOCUMENTATION (OUTPATIENT)
Dept: PHYSICAL THERAPY | Facility: HOSPITAL | Age: 74
End: 2024-05-03
Payer: MEDICARE

## 2024-05-03 NOTE — PROGRESS NOTES
Physical Therapy                 Therapy Communication Note    Patient Name: Rommel Thompson  MRN: [id number]  Today's Date: 5/3/2024     Discipline: Physical Therapy    Missed Visit Reason:  No show    Missed Time: No Show

## 2024-05-14 ENCOUNTER — LAB (OUTPATIENT)
Dept: LAB | Facility: LAB | Age: 74
End: 2024-05-14
Payer: MEDICARE

## 2024-05-14 DIAGNOSIS — Z12.5 SCREENING FOR PROSTATE CANCER: ICD-10-CM

## 2024-05-14 DIAGNOSIS — E78.5 HYPERLIPIDEMIA, UNSPECIFIED HYPERLIPIDEMIA TYPE: ICD-10-CM

## 2024-05-14 DIAGNOSIS — R73.03 PREDIABETES: ICD-10-CM

## 2024-05-14 DIAGNOSIS — I10 HTN (HYPERTENSION), BENIGN: Chronic | ICD-10-CM

## 2024-05-14 DIAGNOSIS — Z95.1 S/P CABG (CORONARY ARTERY BYPASS GRAFT): ICD-10-CM

## 2024-05-14 LAB
ALBUMIN SERPL BCP-MCNC: 4.4 G/DL (ref 3.4–5)
ALP SERPL-CCNC: 58 U/L (ref 33–136)
ALT SERPL W P-5'-P-CCNC: 27 U/L (ref 10–52)
ANION GAP SERPL CALC-SCNC: 12 MMOL/L (ref 10–20)
AST SERPL W P-5'-P-CCNC: 24 U/L (ref 9–39)
BASOPHILS # BLD AUTO: 0.01 X10*3/UL (ref 0–0.1)
BASOPHILS NFR BLD AUTO: 0.1 %
BILIRUB SERPL-MCNC: 0.6 MG/DL (ref 0–1.2)
BUN SERPL-MCNC: 25 MG/DL (ref 6–23)
CALCIUM SERPL-MCNC: 9.7 MG/DL (ref 8.6–10.3)
CHLORIDE SERPL-SCNC: 108 MMOL/L (ref 98–107)
CHOLEST SERPL-MCNC: 123 MG/DL (ref 0–199)
CHOLESTEROL/HDL RATIO: 3
CO2 SERPL-SCNC: 23 MMOL/L (ref 21–32)
CREAT SERPL-MCNC: 0.9 MG/DL (ref 0.5–1.3)
EGFRCR SERPLBLD CKD-EPI 2021: 90 ML/MIN/1.73M*2
EOSINOPHIL # BLD AUTO: 0.16 X10*3/UL (ref 0–0.4)
EOSINOPHIL NFR BLD AUTO: 2.2 %
ERYTHROCYTE [DISTWIDTH] IN BLOOD BY AUTOMATED COUNT: 14.1 % (ref 11.5–14.5)
EST. AVERAGE GLUCOSE BLD GHB EST-MCNC: 114 MG/DL
GLUCOSE SERPL-MCNC: 123 MG/DL (ref 74–99)
HBA1C MFR BLD: 5.6 %
HCT VFR BLD AUTO: 41.9 % (ref 41–52)
HDLC SERPL-MCNC: 40.7 MG/DL
HGB BLD-MCNC: 13.4 G/DL (ref 13.5–17.5)
IMM GRANULOCYTES # BLD AUTO: 0.05 X10*3/UL (ref 0–0.5)
IMM GRANULOCYTES NFR BLD AUTO: 0.7 % (ref 0–0.9)
LDLC SERPL CALC-MCNC: 66 MG/DL
LYMPHOCYTES # BLD AUTO: 1.17 X10*3/UL (ref 0.8–3)
LYMPHOCYTES NFR BLD AUTO: 15.8 %
MCH RBC QN AUTO: 30.2 PG (ref 26–34)
MCHC RBC AUTO-ENTMCNC: 32 G/DL (ref 32–36)
MCV RBC AUTO: 95 FL (ref 80–100)
MONOCYTES # BLD AUTO: 0.58 X10*3/UL (ref 0.05–0.8)
MONOCYTES NFR BLD AUTO: 7.8 %
NEUTROPHILS # BLD AUTO: 5.45 X10*3/UL (ref 1.6–5.5)
NEUTROPHILS NFR BLD AUTO: 73.4 %
NON HDL CHOLESTEROL: 82 MG/DL (ref 0–149)
NRBC BLD-RTO: 0 /100 WBCS (ref 0–0)
PLATELET # BLD AUTO: 145 X10*3/UL (ref 150–450)
POTASSIUM SERPL-SCNC: 4.6 MMOL/L (ref 3.5–5.3)
PROT SERPL-MCNC: 7 G/DL (ref 6.4–8.2)
PSA SERPL-MCNC: 4.55 NG/ML
RBC # BLD AUTO: 4.43 X10*6/UL (ref 4.5–5.9)
SODIUM SERPL-SCNC: 138 MMOL/L (ref 136–145)
TRIGL SERPL-MCNC: 84 MG/DL (ref 0–149)
VLDL: 17 MG/DL (ref 0–40)
WBC # BLD AUTO: 7.4 X10*3/UL (ref 4.4–11.3)

## 2024-05-14 PROCEDURE — G0103 PSA SCREENING: HCPCS

## 2024-05-14 PROCEDURE — 83036 HEMOGLOBIN GLYCOSYLATED A1C: CPT

## 2024-05-14 PROCEDURE — 85025 COMPLETE CBC W/AUTO DIFF WBC: CPT

## 2024-05-14 PROCEDURE — 80061 LIPID PANEL: CPT

## 2024-05-14 PROCEDURE — 80053 COMPREHEN METABOLIC PANEL: CPT

## 2024-05-14 PROCEDURE — 36415 COLL VENOUS BLD VENIPUNCTURE: CPT

## 2024-05-16 ENCOUNTER — EVALUATION (OUTPATIENT)
Dept: PHYSICAL THERAPY | Facility: HOSPITAL | Age: 74
End: 2024-05-16
Payer: MEDICARE

## 2024-05-16 DIAGNOSIS — M25.512 CHRONIC PAIN OF BOTH SHOULDERS: Primary | ICD-10-CM

## 2024-05-16 DIAGNOSIS — M25.511 CHRONIC PAIN OF BOTH SHOULDERS: Primary | ICD-10-CM

## 2024-05-16 DIAGNOSIS — M25.522 LEFT ELBOW PAIN: ICD-10-CM

## 2024-05-16 DIAGNOSIS — G89.29 CHRONIC PAIN OF BOTH SHOULDERS: Primary | ICD-10-CM

## 2024-05-16 PROCEDURE — 97110 THERAPEUTIC EXERCISES: CPT | Mod: GP | Performed by: PHYSICAL THERAPIST

## 2024-05-16 PROCEDURE — 97162 PT EVAL MOD COMPLEX 30 MIN: CPT | Mod: GP | Performed by: PHYSICAL THERAPIST

## 2024-05-16 ASSESSMENT — ENCOUNTER SYMPTOMS
DEPRESSION: 0
OCCASIONAL FEELINGS OF UNSTEADINESS: 0
LOSS OF SENSATION IN FEET: 0

## 2024-05-16 ASSESSMENT — PAIN SCALES - GENERAL: PAINLEVEL_OUTOF10: 2

## 2024-05-16 ASSESSMENT — PAIN DESCRIPTION - DESCRIPTORS: DESCRIPTORS: ACHING;TINGLING

## 2024-05-16 ASSESSMENT — PAIN - FUNCTIONAL ASSESSMENT: PAIN_FUNCTIONAL_ASSESSMENT: 0-10

## 2024-05-16 NOTE — PROGRESS NOTES
Physical Therapy    Physical Therapy Evaluation and Treatment      Patient Name: Rommel Thompson  MRN: [id number]  Today's Date: 5/16/2024  Time Calculation  Start Time: 0300  Stop Time: 0345  Time Calculation (min): 45 min    Assessment:  Bilateral Shoulder pain  and elbow pain and left elbow pain 2 years with a Jan 2024 flared up   No prior ortho surgery to shoulders or elbow. Does have hx carpal tunnel surgery  Has had radicular tingling Rt arm i          Plan:  OP PT Plan  Treatment/Interventions: Cryotherapy, Education/ Instruction, Therapeutic exercises, Therapeutic activities, Manual therapy, Neuromuscular re-education, Iontophoresis  PT Plan: Skilled PT  PT Frequency: 2 times per week  Duration: 4-6 week  Onset Date: 01/01/24 (2 years, flare up Jan)  Certification Period Start Date: 05/16/24  Certification Period End Date: 08/16/24  Rehab Potential: Fair  Plan of Care Agreement: Patient    Current Problem:   1. Chronic pain of both shoulders  Referral to Physical Therapy    Follow Up In Physical Therapy      2. Left elbow pain  Referral to Physical Therapy    Follow Up In Physical Therapy          Subjective   both shoulders and left elbow pain 2 years  General:    Bilateral Shoulder pain  and elbow pain and left elbow pain 2 years with a Jan 2024 flared up   No prior ortho surgery to shoulders or elbow. Does have hx carpal tunnel surgery  Has had radicular tingling Rt arm in last 2 nights  Sleeping:  pain both shoulders 2-7/10  Difficulty reaching left arm to reach head  Rt shoulder is more painful        General  Reason for Referral: Pa shoulder pain, elbow pain  Referred By: Alma  Past Medical History Relevant to Rehab: hx of shoulder pain x 2 years, elbow pain x 2 years. hx of carpal tunnel left , hx of heart surgery  Precautions: cue to stay within pain free with motions. Posture cues        Pain:  Pain Assessment  Pain Assessment: 0-10  Pain Score: 2 (7/10 at night)  Pain Type: Chronic pain  Pain  Location: Shoulder  Pain Orientation: Right, Left  Pain Radiating Towards: night radiates Rt arm to wrist  Pain Descriptors: Aching, Tingling  Pain Frequency: Constant/continuous  Pain Onset: Ongoing (2 years with worsened jan 2024)  Clinical Progression: Gradually worsening       Prior Level of Function:  Sedentary  Working on real estate license   Has not been walking as much       Objective        General Assessments:  Refer for Pa shoulder pain, elbow pain.  Chronic 2  years , flared up more since Jan 2024  No prior shoulder surgery. Has had radicular symptoms  in arm for couple of nights - advise on posture and cue to stay within comfort for all exercises.  Lacking in ROM and strength and reaching function      Extremity/Trunk Assessments:    AROM shoulder ( sitting)  Flex Rt 125  ( pain with return to neutral)   left 125  AAROM 135   ABD Rt 105 ( pain)   left  160 ( pain)   ER Rt 20  left 15  IR L5  Pain pa  Elbow Flex full  Elbow ext symmetrical , lacks 10'  Supinate Rt 50%  left full    Cervical   Flex WNL  Side bend rt 15  left 10  Rotate rt 25  L 30      + TTP Rt shoulder and elbow, tight UT pa  Left arm catches with return from elevation, intermittent    Sleeps with CPAP and reviewed his posture of his neck   Sitting at tv - tendency to be turned at head / neck  Likely posture and positioning contributing to some symptoms  Advise to neutral align                Outcome Measures:  Quickdash 43.18       Treatments: no surgery to shoulders or elbow. Chronic 2 years of pain. Cue to avoid pushing through pain  EXERCISES       Date 5-16-24      VISIT# #1 # # #    REPS REPS REPS REPS   Pendulums        Pulleys sitting  Flex, scaption 2 min each             Supinate AROM / AAROM  Elbow Flex / ext palms up/ down 10x  10 x             Scapular retraction 10 x       Wall climb       Prone arm raises       Rows  ;lat pull       Ball on wall                     Posture ed for home sleeping with cpap  Posture re  alignment watching tv/ computer Educate re posture              HEP   (   )          EDUCATION:  Outpatient Education  Individual(s) Educated: Patient  Education Provided: POC  Patient Response to Education: Patient/Caregiver Verbalized Understanding of Information    Goals:  Active       PT Problem       PT Goal       Start:  05/16/24    Expected End:  06/06/24         Patient to be independent with home exercises within pain-free range to restore functional mobility shoulder    Patient to progress Range of motion as tolerated in UEs    Patient to sleep undisturbed by pain through use of positioning and strategies for support of shoulder           PT Goal        Start:  05/16/24    Expected End:  06/27/24       Patient to reduce pain in shoulder to reduce  2 pain grades overall  for increased ease with IADLs    Patient to increase strength by 1/3 MMT grade or more in targeted areas and for pt to demonstrate awareness of scapular stabilization with UE exercises and activities to improve mobility and function    Patient to have Quickdash show improvement of 4 points or more to indicate increased ease with function

## 2024-05-22 ENCOUNTER — TREATMENT (OUTPATIENT)
Dept: PHYSICAL THERAPY | Facility: HOSPITAL | Age: 74
End: 2024-05-22
Payer: MEDICARE

## 2024-05-22 DIAGNOSIS — M25.522 LEFT ELBOW PAIN: ICD-10-CM

## 2024-05-22 DIAGNOSIS — G89.29 CHRONIC PAIN OF BOTH SHOULDERS: ICD-10-CM

## 2024-05-22 DIAGNOSIS — M25.511 CHRONIC PAIN OF BOTH SHOULDERS: ICD-10-CM

## 2024-05-22 DIAGNOSIS — M25.512 CHRONIC PAIN OF BOTH SHOULDERS: ICD-10-CM

## 2024-05-22 PROCEDURE — 97110 THERAPEUTIC EXERCISES: CPT | Mod: GP,CQ

## 2024-05-22 ASSESSMENT — PAIN - FUNCTIONAL ASSESSMENT: PAIN_FUNCTIONAL_ASSESSMENT: 0-10

## 2024-05-22 ASSESSMENT — PAIN SCALES - GENERAL: PAINLEVEL_OUTOF10: 0 - NO PAIN

## 2024-05-22 NOTE — PROGRESS NOTES
Physical Therapy    Physical Therapy Treatment    Patient Name: Rommel Thompson  MRN: [id number]  : 1950   Today's Date: 2024  Time Calculation  Start Time: 1300  Stop Time: 1350  Time Calculation (min): 50 min     PT Therapeutic Procedures Time Entry  Manual Therapy Time Entry: 8  Therapeutic Exercise Time Entry: 42                 Visit Number: 2    Current Problem  Problem List Items Addressed This Visit             ICD-10-CM    Bilateral shoulder pain M25.511, M25.512    Left elbow pain M25.522        Subjective   General  Pt states he has discomfort in his Pa forearms with Supination exercise. Pt states he does not notice many sx during the day, when he lays down at night he notices increased neck pain, shoulder pain, forearm pain and at times N/T in R first and second digit along with pain and burning in R palm.   Precautions  Precautions  Precautions Comment: none    Pain  Pain Assessment: 0-10  Pain Score: 0 - No pain  Pain Location: Shoulder  Pain Orientation: Right, Left      Objective   Hypertonicity on Pa UT, R SCM, Pa levators.  Pt demonstrates position of comfort L slight SB  Pt demonstrates limited cervical ROM Pa SB R>L, Pa rotation R>L and ext     Treatments:     EXERCISES       Date 24     VISIT# #1 #2 # #    REPS REPS REPS REPS   Pendulums        Pulleys sitting  Flex, scaption 2 min each 3min ea             Supinate AROM / AAROM  Elbow Flex / ext palms up/ down 10x  10 x X10ea   X10ea             Scapular retraction 10 x  5sec x10     Wall climb       Prone arm raises       Tband: Rows              lat pull  Green x10 (discomfort)  Green 2x10     Ball on wall       Doorway pec stretch   [id number] (discomfort)     Cervical Isometrics   5sec x5     Posture ed for home sleeping with cpap  Posture re alignment watching tv/ computer Educate re posture  Education for proper desk set up and midline posture      Manual cervical traction  5min, PROM & stretching     HEP  See  below     OP Education:  Access Code: [email or web address]  URL: [email or web address]  Date: 05/22/2024  Prepared by: Briana Kaiser    Exercises  - Seated Scapular Retraction  - 2-3 x daily - 7 x weekly - 10 reps - 5sec hold  - Seated Shoulder Scaption AAROM with Pulley at Side  - 1 x daily - 7 x weekly - 3min hold  - Seated Shoulder Flexion AAROM with Pulley Behind  - 1 x daily - 7 x weekly - 3min hold  - Standing Isometric Cervical Sidebending with Manual Resistance  - 2 x daily - 7 x weekly - 10 reps - 5sec hold  - Standing Isometric Cervical Flexion with Manual Resistance  - 2 x daily - 7 x weekly - 10 reps - 5sec hold  - Standing Isometric Cervical Extension with Manual Resistance  - 2 x daily - 7 x weekly - 10 reps - 5sec hold  - Circular Shoulder Pendulum with Table Support  - 2-3 x daily - 7 x weekly - 10 reps    Assessment:   Pt tolerated all exercises well with minimal difficulty reporting no increase in pain at end of session. Pt demonstrates good understanding of HEP without questions.     Plan:  Monitor pt response to session and progress as tolerated, discuss with PT possibility of radicular sx coming from neck.     Goals:  Active       PT Problem       PT Goal       Start:  05/16/24    Expected End:  06/06/24         Patient to be independent with home exercises within pain-free range to restore functional mobility shoulder    Patient to progress Range of motion as tolerated in UEs    Patient to sleep undisturbed by pain through use of positioning and strategies for support of shoulder           PT Goal        Start:  05/16/24    Expected End:  06/27/24       Patient to reduce pain in shoulder to reduce  2 pain grades overall  for increased ease with IADLs    Patient to increase strength by 1/3 MMT grade or more in targeted areas and for pt to demonstrate awareness of scapular stabilization with UE exercises and activities to improve mobility and function    Patient to have Quickdash show  improvement of 4 points or more to indicate increased ease with function

## 2024-05-29 ENCOUNTER — APPOINTMENT (OUTPATIENT)
Dept: PHYSICAL THERAPY | Facility: HOSPITAL | Age: 74
End: 2024-05-29
Payer: MEDICARE

## 2024-06-10 DIAGNOSIS — R97.20 ELEVATED PSA, LESS THAN 10 NG/ML: Primary | ICD-10-CM

## 2024-06-11 DIAGNOSIS — I65.29 STENOSIS OF CAROTID ARTERY, UNSPECIFIED LATERALITY: ICD-10-CM

## 2024-06-11 RX ORDER — CLOPIDOGREL BISULFATE 75 MG/1
75 TABLET ORAL DAILY
Qty: 90 TABLET | Refills: 0 | Status: SHIPPED | OUTPATIENT
Start: 2024-06-11

## 2024-06-17 ENCOUNTER — OFFICE VISIT (OUTPATIENT)
Dept: UROLOGY | Facility: HOSPITAL | Age: 74
End: 2024-06-17
Payer: MEDICARE

## 2024-06-17 ENCOUNTER — APPOINTMENT (OUTPATIENT)
Dept: LAB | Facility: LAB | Age: 74
End: 2024-06-17
Payer: MEDICARE

## 2024-06-17 DIAGNOSIS — R97.20 ELEVATED PSA, LESS THAN 10 NG/ML: ICD-10-CM

## 2024-06-17 DIAGNOSIS — N40.0 BENIGN PROSTATIC HYPERPLASIA, UNSPECIFIED WHETHER LOWER URINARY TRACT SYMPTOMS PRESENT: Primary | ICD-10-CM

## 2024-06-17 DIAGNOSIS — N52.8 OTHER MALE ERECTILE DYSFUNCTION: ICD-10-CM

## 2024-06-17 DIAGNOSIS — R39.9 LOWER URINARY TRACT SYMPTOMS (LUTS): ICD-10-CM

## 2024-06-17 PROCEDURE — 87086 URINE CULTURE/COLONY COUNT: CPT | Performed by: UROLOGY

## 2024-06-17 PROCEDURE — 99214 OFFICE O/P EST MOD 30 MIN: CPT | Performed by: UROLOGY

## 2024-06-17 PROCEDURE — 99204 OFFICE O/P NEW MOD 45 MIN: CPT | Performed by: UROLOGY

## 2024-06-17 PROCEDURE — 3008F BODY MASS INDEX DOCD: CPT | Performed by: UROLOGY

## 2024-06-17 PROCEDURE — 1157F ADVNC CARE PLAN IN RCRD: CPT | Performed by: UROLOGY

## 2024-06-17 PROCEDURE — 1123F ACP DISCUSS/DSCN MKR DOCD: CPT | Performed by: UROLOGY

## 2024-06-17 RX ORDER — TADALAFIL 5 MG/1
5 TABLET ORAL DAILY
Qty: 30 TABLET | Refills: 11 | Status: SHIPPED | OUTPATIENT
Start: 2024-06-17

## 2024-06-17 NOTE — PROGRESS NOTES
HPI:   Rommel Thompson is a 73 y.o. who presents with an elevated PSA ref by PCP.    Urinary Difficulties: Yes; (on flomax)-not sure if helping, recently not working. He has frequency and his flow has been weak. Denies hematuria.  Unexpected weight loss: Weight loss; last 2 weeks (4-5 lbs)  Family history of prostate cancer:  None  Previous biopsy: No  Smoker? No, used to     Has triple bipass surgery.  Drinks wine.    Labs:  PSA, 5/14/2024: 4.55    Lab Results   Component Value Date    PSA 2.2 08/10/2023       PMH:  Past Medical History:   Diagnosis Date    Bilateral primary osteoarthritis of knee 06/28/2022    Osteoarthritis of knees, bilateral    Carpal tunnel syndrome, left upper limb 01/06/2023    Carpal tunnel syndrome of left wrist    Essential (primary) hypertension 08/09/2022    Benign essential HTN    Gastro-esophageal reflux disease without esophagitis 06/28/2022    GERD (gastroesophageal reflux disease)    Hyperlipidemia, unspecified 08/09/2022    Hyperlipemia    Male erectile dysfunction, unspecified 08/21/2016    Erectile dysfunction    Morbid (severe) obesity due to excess calories (Multi) 08/09/2022    Class 2 severe obesity with serious comorbidity and body mass index (BMI) of 37.0 to 37.9 in adult, unspecified obesity type    Obstructive sleep apnea (adult) (pediatric) 09/08/2022    ALVIN on CPAP    Other conditions influencing health status 08/21/2016    Normal echocardiogram    Personal history of other diseases of the circulatory system     History of hypertension    Personal history of other endocrine, nutritional and metabolic disease 08/21/2016    History of diabetes mellitus    Personal history of urinary (tract) infections 06/29/2016    History of urinary tract infection    Prediabetes 08/09/2022    Prediabetes        PSH:  Past Surgical History:   Procedure Laterality Date    CORONARY ARTERY BYPASS GRAFT  08/08/2017    CABG    CT ANGIO NECK  8/24/2017    CT NECK ANGIO W AND WO IV CONTRAST  8/24/2017 [facility] ANCILLARY LEGACY    CT HEAD ANGIO W AND WO IV CONTRAST  8/24/2017    CT HEAD ANGIO W AND WO IV CONTRAST 8/24/2017 [facility] ANCILLARY LEGACY    GASTRIC BYPASS  07/17/2017    High Gastric Bypass    OTHER SURGICAL HISTORY  07/17/2017    Carotid Artery Catheterization    TOTAL KNEE ARTHROPLASTY  05/12/2016    Knee Replacement        Medications:    Current Outpatient Medications:     aspirin 81 mg EC tablet, Take 1 tablet (81 mg) by mouth once daily., Disp: , Rfl:     atorvastatin (Lipitor) 40 mg tablet, Take 1 tablet (40 mg) by mouth once daily., Disp: 90 tablet, Rfl: 0    carvedilol (Coreg) 25 mg tablet, Take 1 tablet (25 mg) by mouth 2 times a day., Disp: 180 tablet, Rfl: 0    clopidogrel (Plavix) 75 mg tablet, TAKE 1 TABLET BY MOUTH EVERY DAY, Disp: 90 tablet, Rfl: 0    cyanocobalamin, vitamin B-12, (Vitamin B-12) 1,000 mcg tablet extended release, , Disp: , Rfl:     losartan (Cozaar) 50 mg tablet, Take 1 tablet (50 mg) by mouth once daily., Disp: 90 tablet, Rfl: 0    metFORMIN (Glucophage) 1,000 mg tablet, Take 1 tablet (1,000 mg) by mouth 2 times a day., Disp: 180 tablet, Rfl: 0    multivitamin with minerals (multivit-min-iron fum-folic ac) tablet, Take 1 tablet by mouth once daily., Disp: , Rfl:     tamsulosin (Flomax) 0.4 mg 24 hr capsule, Take 1 capsule (0.4 mg) by mouth once daily., Disp: 90 capsule, Rfl: 0    Allergy:  Allergies   Allergen Reactions    Sulfamethoxazole-Trimethoprim Unknown        Exam  NAD  Nonlabored Breathing  Abd nondistended      Assessment/Plan  # Elevated PSA: Discussed the different etiologies of elevated PSA, as well as the risks and benefits of screening. We discussed prostate cancer and both MRI and prostate biopsy. I counseled the patient regarding risk of infection, bleeding, etc from prostate biopsy.   prostate MRI   repeat psa     #LUTS:   - Add 5mg cialis daily - r/b/a discussed  -continue flomax .4mg daily     FU after MRI, can be virtual    Scribe Attestation  By  signing my name below, I, Lavell Hunt,   attest that this documentation has been prepared under the direction and in the presence of Anna Roche MD.

## 2024-06-18 LAB — BACTERIA UR CULT: NO GROWTH

## 2024-06-24 ENCOUNTER — LAB (OUTPATIENT)
Dept: LAB | Facility: LAB | Age: 74
End: 2024-06-24
Payer: MEDICARE

## 2024-06-24 DIAGNOSIS — R39.9 LOWER URINARY TRACT SYMPTOMS (LUTS): ICD-10-CM

## 2024-06-24 DIAGNOSIS — N52.8 OTHER MALE ERECTILE DYSFUNCTION: ICD-10-CM

## 2024-06-24 DIAGNOSIS — R97.20 ELEVATED PSA, LESS THAN 10 NG/ML: ICD-10-CM

## 2024-06-24 DIAGNOSIS — N40.0 BENIGN PROSTATIC HYPERPLASIA, UNSPECIFIED WHETHER LOWER URINARY TRACT SYMPTOMS PRESENT: ICD-10-CM

## 2024-06-24 LAB — PSA SERPL-MCNC: 1.79 NG/ML

## 2024-06-24 PROCEDURE — 84153 ASSAY OF PSA TOTAL: CPT

## 2024-06-24 PROCEDURE — 36415 COLL VENOUS BLD VENIPUNCTURE: CPT

## 2024-06-28 ENCOUNTER — HOSPITAL ENCOUNTER (OUTPATIENT)
Dept: RADIOLOGY | Facility: HOSPITAL | Age: 74
Discharge: HOME | End: 2024-06-28
Payer: MEDICARE

## 2024-06-28 DIAGNOSIS — R97.20 ELEVATED PSA, LESS THAN 10 NG/ML: ICD-10-CM

## 2024-06-28 DIAGNOSIS — R39.9 LOWER URINARY TRACT SYMPTOMS (LUTS): ICD-10-CM

## 2024-06-28 DIAGNOSIS — N52.8 OTHER MALE ERECTILE DYSFUNCTION: ICD-10-CM

## 2024-06-28 DIAGNOSIS — N40.0 BENIGN PROSTATIC HYPERPLASIA, UNSPECIFIED WHETHER LOWER URINARY TRACT SYMPTOMS PRESENT: ICD-10-CM

## 2024-06-28 PROCEDURE — 72197 MRI PELVIS W/O & W/DYE: CPT

## 2024-06-28 PROCEDURE — 2550000001 HC RX 255 CONTRASTS: Performed by: UROLOGY

## 2024-06-28 PROCEDURE — A9575 INJ GADOTERATE MEGLUMI 0.1ML: HCPCS | Performed by: UROLOGY

## 2024-06-28 RX ORDER — GADOTERATE MEGLUMINE 376.9 MG/ML
20 INJECTION INTRAVENOUS
Status: COMPLETED | OUTPATIENT
Start: 2024-06-28 | End: 2024-06-28

## 2024-06-29 ENCOUNTER — APPOINTMENT (OUTPATIENT)
Dept: RADIOLOGY | Facility: HOSPITAL | Age: 74
End: 2024-06-29
Payer: MEDICARE

## 2024-07-17 ENCOUNTER — APPOINTMENT (OUTPATIENT)
Dept: UROLOGY | Facility: CLINIC | Age: 74
End: 2024-07-17
Payer: MEDICARE

## 2024-07-17 DIAGNOSIS — R97.20 ELEVATED PSA: Primary | ICD-10-CM

## 2024-07-17 DIAGNOSIS — N40.1 BENIGN LOCALIZED PROSTATIC HYPERPLASIA WITH LOWER URINARY TRACT SYMPTOMS (LUTS): ICD-10-CM

## 2024-07-17 DIAGNOSIS — R35.0 URINARY FREQUENCY: ICD-10-CM

## 2024-07-17 PROCEDURE — 1157F ADVNC CARE PLAN IN RCRD: CPT | Performed by: UROLOGY

## 2024-07-17 PROCEDURE — 99214 OFFICE O/P EST MOD 30 MIN: CPT | Performed by: UROLOGY

## 2024-07-17 PROCEDURE — 1123F ACP DISCUSS/DSCN MKR DOCD: CPT | Performed by: UROLOGY

## 2024-07-17 NOTE — PROGRESS NOTES
Virtual or Telephone Consent    An interactive audio and video telecommunication system which permits real time communications between the patient (at the originating site) and provider (at the distant site) was utilized to provide this telehealth service.   Patient provided consent    HPI:   Rommel Thompson is a 73 y.o. who presents with an elevated PSA     Last visit 6/17/24:  - Prostate MRI   - Repeat psa   - Add 5mg cialis daily - r/b/a discussed  - continue flomax .4mg daily     Today's visit:  Urinary Difficulties: Yes; (on flomax) - not sure if helping, recently not working.   - Still having difficulties with urination and frequent low volume voids.    Unexpected weight loss: Weight loss; last 2 weeks (4-5 lbs)  Family history of prostate cancer:  None  Previous biopsy: No  Smoker? No, used to      Has triple bipass surgery.  Drinks wine.    Imaging:  MRI prostate, 6/28/24: Personally reviewed and interpreted  Prostate size 40.8 g  IMPRESSION:  BPH changes of the transition zone. Diffuse non nodular  hypointensities within the peripheral zone, without evidence of  focally restricted diffusion ( PI-RADS 2).    PI-RADS 2 - Low    Labs  PVR 6/17/24: 29mL     Urine culture, 6/17/24: No significant growth    Lab Results   Component Value Date    PSA 1.79 06/24/2024    PSA 2.2 08/10/2023     Lab Results   Component Value Date    HGBA1C 5.6 05/14/2024     Lab Results   Component Value Date    HCT 41.9 05/14/2024     PMH:  Past Medical History:   Diagnosis Date    Bilateral primary osteoarthritis of knee 06/28/2022    Osteoarthritis of knees, bilateral    Carpal tunnel syndrome, left upper limb 01/06/2023    Carpal tunnel syndrome of left wrist    Essential (primary) hypertension 08/09/2022    Benign essential HTN    Gastro-esophageal reflux disease without esophagitis 06/28/2022    GERD (gastroesophageal reflux disease)    Hyperlipidemia, unspecified 08/09/2022    Hyperlipemia    Male erectile dysfunction, unspecified  08/21/2016    Erectile dysfunction    Morbid (severe) obesity due to excess calories (Multi) 08/09/2022    Class 2 severe obesity with serious comorbidity and body mass index (BMI) of 37.0 to 37.9 in adult, unspecified obesity type    Obstructive sleep apnea (adult) (pediatric) 09/08/2022    ALVIN on CPAP    Other conditions influencing health status 08/21/2016    Normal echocardiogram    Personal history of other diseases of the circulatory system     History of hypertension    Personal history of other endocrine, nutritional and metabolic disease 08/21/2016    History of diabetes mellitus    Personal history of urinary (tract) infections 06/29/2016    History of urinary tract infection    Prediabetes 08/09/2022    Prediabetes      PSH:  Past Surgical History:   Procedure Laterality Date    CORONARY ARTERY BYPASS GRAFT  08/08/2017    CABG    CT ANGIO NECK  8/24/2017    CT NECK ANGIO W AND WO IV CONTRAST 8/24/2017 [facility]    CT HEAD ANGIO W AND WO IV CONTRAST  8/24/2017    CT HEAD ANGIO W AND WO IV CONTRAST 8/24/2017 [facility]    GASTRIC BYPASS  07/17/2017    High Gastric Bypass    OTHER SURGICAL HISTORY  07/17/2017    Carotid Artery Catheterization    TOTAL KNEE ARTHROPLASTY  05/12/2016    Knee Replacement      Medications:    Current Outpatient Medications:     aspirin 81 mg EC tablet, Take 1 tablet (81 mg) by mouth once daily., Disp: , Rfl:     atorvastatin (Lipitor) 40 mg tablet, Take 1 tablet (40 mg) by mouth once daily., Disp: 90 tablet, Rfl: 0    carvedilol (Coreg) 25 mg tablet, Take 1 tablet (25 mg) by mouth 2 times a day., Disp: 180 tablet, Rfl: 0    clopidogrel (Plavix) 75 mg tablet, TAKE 1 TABLET BY MOUTH EVERY DAY, Disp: 90 tablet, Rfl: 0    cyanocobalamin, vitamin B-12, (Vitamin B-12) 1,000 mcg tablet extended release, , Disp: , Rfl:     losartan (Cozaar) 50 mg tablet, Take 1 tablet (50 mg) by mouth once daily., Disp: 90 tablet, Rfl: 0    metFORMIN (Glucophage) 1,000 mg tablet,  Take 1 tablet (1,000 mg) by mouth 2 times a day., Disp: 180 tablet, Rfl: 0    multivitamin with minerals (multivit-min-iron fum-folic ac) tablet, Take 1 tablet by mouth once daily., Disp: , Rfl:     tadalafil (Cialis) 5 mg tablet, Take 1 tablet (5 mg) by mouth once daily., Disp: 30 tablet, Rfl: 11    tamsulosin (Flomax) 0.4 mg 24 hr capsule, Take 1 capsule (0.4 mg) by mouth once daily., Disp: 90 capsule, Rfl: 0    Allergy:  Allergies   Allergen Reactions    Sulfamethoxazole-Trimethoprim Unknown      Exam  CONSTITUTIONAL:        No acute distress    HEAD:        Normocephalic and atraumatic    CHEST / RESPIRATORY      no excess work of breathing, no respiratory distress,    ABDOMEN / GASTROINTESTINAL:        Abdomen nondistended      Assessment/Plan  # Elevated PSA: Discussed the different etiologies of elevated PSA, as well as the risks and benefits of screening. We discussed prostate cancer and both MRI and prostate biopsy. I counseled the patient regarding risk of infection, bleeding, etc from prostate biopsy.   - repeat psa      #LUTS:   We discussed the different causes for urinary difficulties as patients age, specifically BPH, bladder overactivity, and even stricture disease. We discussed different medications that could help him with his symptoms, including alpha blockers and finasteride, as well as surgical options such as TURP and Urolift.  - Reviewed MRI with patient. He is still having difficulties with urination and frequent low volume voids.  - Discussed outlet procedure and anti-cholinergics.  - Referred to Dr. Jimenez  - Continue 5mg cialis daily  - continue flomax .4mg daily     Will need PSA in six months, can be done with me or Dr. Barbara Monte Attestation  By signing my name below, I, Lavell Hunt,   attest that this documentation has been prepared under the direction and in the presence of Anna Roche MD.

## 2024-07-18 PROBLEM — Z86.79 HISTORY OF HYPERTENSION: Status: ACTIVE | Noted: 2024-07-18

## 2024-08-13 ENCOUNTER — APPOINTMENT (OUTPATIENT)
Dept: PRIMARY CARE | Facility: CLINIC | Age: 74
End: 2024-08-13
Payer: MEDICARE

## 2024-08-29 NOTE — PROGRESS NOTES
"Counseling:  The patient was counseled regarding diagnostic results, instructions for management, risk factor reductions, prognosis, patient and family education, impressions, risks and benefits of treatment options and importance of compliance with treatment.      Chief Complaint:   The patient presents today for annual followup of CAD and carotid artery disease.      History Of Present Illness:    Rommel Thompson is a 73 year old male patient who presents today in the company of his wife for annual followup of CAD and carotid artery disease. His PMH is significant for HTN, CAD s/p CABG 2017, Right CEA 2017, CMP, GERD, hyperlipidemia, ALVIN (CPAP), h/o TIA and vocal cord paralysis. Over the past year, the patient states that he has done well from a cardiac standpoint. He denies any CP, chest discomfort or SOB. He reports occasional palpitations, as well as a productive cough and dysphagia. BP has been stable. EKG today shows NSR with no acute changes. The patient is compliant with his prescribed medications.      Last Recorded Vitals:  Vitals:    08/30/24 1428   BP: 126/60   BP Location: Left arm   Patient Position: Sitting   BP Cuff Size: Adult   Pulse: 64   SpO2: 95%   Weight: 105 kg (231 lb 12.8 oz)   Height: 1.676 m (5' 6\")       Past Surgical History:  He has a past surgical history that includes Total knee arthroplasty (05/12/2016); Gastric bypass (07/17/2017); Other surgical history (07/17/2017); Coronary artery bypass graft (08/08/2017); CT angio head w and wo IV contrast (8/24/2017); and CT angio neck (8/24/2017).      Social History:  He reports that he quit smoking about 23 years ago. His smoking use included cigarettes. He has never used smokeless tobacco. He reports current alcohol use of about 14.0 standard drinks of alcohol per week. He reports that he does not use drugs.    Family History:  Family History   Problem Relation Name Age of Onset    No Known Problems Mother      No Known Problems Father   "        Allergies:  Sulfamethoxazole-trimethoprim    Outpatient Medications:  Current Outpatient Medications   Medication Instructions    aspirin 81 mg, oral, Daily    atorvastatin (LIPITOR) 40 mg, oral, Daily    carvedilol (COREG) 25 mg, oral, 2 times daily    cholecalciferol (VITAMIN D-3) 5,000 Units, oral    clopidogrel (PLAVIX) 75 mg, oral, Daily    cyanocobalamin, vitamin B-12, (Vitamin B-12) 1,000 mcg tablet extended release     losartan (COZAAR) 50 mg, oral, Daily    metFORMIN (GLUCOPHAGE) 1,000 mg, oral, 2 times daily    multivitamin with minerals (multivit-min-iron fum-folic ac) tablet 1 tablet, oral, Daily    tadalafil (CIALIS) 5 mg, oral, Daily    tamsulosin (FLOMAX) 0.4 mg, oral, Daily     Review of Systems   Respiratory:  Positive for cough (productive).    Gastrointestinal:  Positive for dysphagia.   All other systems reviewed and are negative.     Physical Exam:  Constitutional:       Appearance: Healthy appearance. Not in distress.   Neck:      Vascular: No JVR. JVD normal.   Pulmonary:      Effort: Pulmonary effort is normal.      Breath sounds: Normal breath sounds. No wheezing. No rhonchi. No rales.   Chest:      Chest wall: Not tender to palpatation.   Cardiovascular:      PMI at left midclavicular line. Normal rate. Regular rhythm. Normal S1. Normal S2.       Murmurs: There is no murmur.      No gallop.  No click. No rub.   Pulses:     Intact distal pulses.   Edema:     Peripheral edema absent.   Abdominal:      General: Bowel sounds are normal.      Palpations: Abdomen is soft.      Tenderness: There is no abdominal tenderness.   Musculoskeletal: Normal range of motion.         General: No tenderness. Skin:     General: Skin is warm and dry.   Neurological:      General: No focal deficit present.      Mental Status: Alert and oriented to person, place and time.          Last Labs:  CBC -  Lab Results   Component Value Date    WBC 7.4 05/14/2024    HGB 13.4 (L) 05/14/2024    HCT 41.9 05/14/2024     MCV 95 05/14/2024     (L) 05/14/2024       CMP -  Lab Results   Component Value Date    CALCIUM 9.7 05/14/2024    PROT 7.0 05/14/2024    ALBUMIN 4.4 05/14/2024    AST 24 05/14/2024    ALT 27 05/14/2024    ALKPHOS 58 05/14/2024    BILITOT 0.6 05/14/2024       LIPID PANEL -   Lab Results   Component Value Date    CHOL 123 05/14/2024    TRIG 84 05/14/2024    HDL 40.7 05/14/2024    CHHDL 3.0 05/14/2024    LDLF 77 06/13/2023    VLDL 17 05/14/2024    NHDL 82 05/14/2024       RENAL FUNCTION PANEL -   Lab Results   Component Value Date    GLUCOSE 123 (H) 05/14/2024     05/14/2024    K 4.6 05/14/2024     (H) 05/14/2024    CO2 23 05/14/2024    ANIONGAP 12 05/14/2024    BUN 25 (H) 05/14/2024    CREATININE 0.90 05/14/2024    GFRMALE 84 05/12/2023    CALCIUM 9.7 05/14/2024    ALBUMIN 4.4 05/14/2024        Lab Results   Component Value Date    HGBA1C 5.6 05/14/2024       Last Cardiology Tests:  08/18/2022 - Vascular Lab Carotid Artery Duplex U/S   1. Right Carotid: Findings are consistent with less than 50% stenosis of the right proximal ICA. Right external carotid artery appears patent with no evidence of stenosis. The right vertebral artery is patent with antegrade flow. No evidence of hemodynamically significant stenosis in the right subclavian.  2. Left Carotid: Findings are consistent with an occlusion of the left ICA. There are elevated velocities in the left ECA that are suggestive of disease. The left vertebral artery is patent with antegrade flow. No evidence of hemodynamically significant stenosis in the left subclavian.     09/07/2017 - TTE  1. Mild left ventricular systolic dysfunction with regional abnormalities with an ejection fraction of 45%.  2. Inferior, inferolateral, and anterolateral wall segments are hypokinetic.  3. Mild right ventricular systolic dysfunction.  4. Mild mitral regurgitation.  5. Trace tricuspid regurgitation.     Lab review: I have personally reviewed the laboratory  result(s).    Assessment/Plan   1) CAD s/p CABG  On ASA 81 mg daily, atorvastatin 40 mg daily, carvedilol 25 mg BID, Plavix 75 mg daily, losartan 50 mg daily  Stress test and echo in  last year was negative  Lipid panel 05/14/2024 with total cholesterol, LDL and triglycerides of 123, 66 and 84 respectively   Denies CP, chest discomfort or SOB  Reports occasional palpitations  Reports productive cough and dysphagia   BP stable  EKG stable  Continue current medical Rx   Check echo  Check CXR  F/U after testing     2) Carotid Artery Disease, Hx of TIA s/p CEA  Carotid US stable   Check carotid duplex   F/U after testing      Scribe Attestation  By signing my name below, I, Lavell Robles   attest that this documentation has been prepared under the direction and in the presence of Tru Guerrero MD.

## 2024-08-30 ENCOUNTER — HOSPITAL ENCOUNTER (OUTPATIENT)
Dept: RADIOLOGY | Facility: HOSPITAL | Age: 74
Discharge: HOME | End: 2024-08-30
Payer: MEDICARE

## 2024-08-30 ENCOUNTER — OFFICE VISIT (OUTPATIENT)
Dept: CARDIOLOGY | Facility: HOSPITAL | Age: 74
End: 2024-08-30
Payer: MEDICARE

## 2024-08-30 VITALS
BODY MASS INDEX: 37.25 KG/M2 | SYSTOLIC BLOOD PRESSURE: 126 MMHG | WEIGHT: 231.8 LBS | HEIGHT: 66 IN | HEART RATE: 64 BPM | DIASTOLIC BLOOD PRESSURE: 60 MMHG | OXYGEN SATURATION: 95 %

## 2024-08-30 DIAGNOSIS — I25.10 CORONARY ARTERY DISEASE INVOLVING NATIVE CORONARY ARTERY OF NATIVE HEART, UNSPECIFIED WHETHER ANGINA PRESENT: ICD-10-CM

## 2024-08-30 DIAGNOSIS — I65.23 CAROTID ARTERY STENOSIS WITHOUT CEREBRAL INFARCTION, BILATERAL: ICD-10-CM

## 2024-08-30 DIAGNOSIS — I42.9 CARDIOMYOPATHY (MULTI): ICD-10-CM

## 2024-08-30 DIAGNOSIS — I65.29 CAROTID ARTERY STENOSIS: Primary | ICD-10-CM

## 2024-08-30 DIAGNOSIS — I65.29 CAROTID ARTERY STENOSIS: ICD-10-CM

## 2024-08-30 PROCEDURE — 3074F SYST BP LT 130 MM HG: CPT | Performed by: INTERNAL MEDICINE

## 2024-08-30 PROCEDURE — 3078F DIAST BP <80 MM HG: CPT | Performed by: INTERNAL MEDICINE

## 2024-08-30 PROCEDURE — 93005 ELECTROCARDIOGRAM TRACING: CPT | Performed by: INTERNAL MEDICINE

## 2024-08-30 PROCEDURE — 1159F MED LIST DOCD IN RCRD: CPT | Performed by: INTERNAL MEDICINE

## 2024-08-30 PROCEDURE — 3008F BODY MASS INDEX DOCD: CPT | Performed by: INTERNAL MEDICINE

## 2024-08-30 PROCEDURE — 99213 OFFICE O/P EST LOW 20 MIN: CPT | Performed by: INTERNAL MEDICINE

## 2024-08-30 PROCEDURE — 1157F ADVNC CARE PLAN IN RCRD: CPT | Performed by: INTERNAL MEDICINE

## 2024-08-30 PROCEDURE — 93010 ELECTROCARDIOGRAM REPORT: CPT | Performed by: INTERNAL MEDICINE

## 2024-08-30 PROCEDURE — 71046 X-RAY EXAM CHEST 2 VIEWS: CPT

## 2024-08-30 PROCEDURE — 1160F RVW MEDS BY RX/DR IN RCRD: CPT | Performed by: INTERNAL MEDICINE

## 2024-08-30 PROCEDURE — 1123F ACP DISCUSS/DSCN MKR DOCD: CPT | Performed by: INTERNAL MEDICINE

## 2024-08-30 ASSESSMENT — ENCOUNTER SYMPTOMS: COUGH: 1

## 2024-08-30 NOTE — PATIENT INSTRUCTIONS
Continue all current medications as prescribed.  Dr. Guerrero has ordered a chest x-ray.   Dr. Guerrero has also ordered an echocardiogram (ultrasound of the heart) to followup on your heart function and structure, as well as an ultrasound of your carotid arteries.   Followup with Dr. Guerrero after the above tests.    If you have any questions or cardiac concerns, please call our office at [phone].

## 2024-08-30 NOTE — LETTER
"August 30, 2024     Pilo Marquez MD  [address]  [facility]  [facility] [address]    Patient: Rommel Thompson   YOB: 1950   Date of Visit: 8/30/2024       Dear Dr. Pilo Marquez MD:    Thank you for referring Rommel Thompson to me for evaluation. Below are my notes for this consultation.  If you have questions, please do not hesitate to call me. I look forward to following your patient along with you.       Sincerely,     rTu Guerrero MD      CC: No Recipients  ______________________________________________________________________________________    Counseling:  The patient was counseled regarding diagnostic results, instructions for management, risk factor reductions, prognosis, patient and family education, impressions, risks and benefits of treatment options and importance of compliance with treatment.      Chief Complaint:   The patient presents today for annual followup of CAD and carotid artery disease.      History Of Present Illness:    Rommel Thompson is a 73 year old male patient who presents today in the company of his wife for annual followup of CAD and carotid artery disease. His PMH is significant for HTN, CAD s/p CABG 2017, Right CEA 2017, CMP, GERD, hyperlipidemia, ALVIN (CPAP), h/o TIA and vocal cord paralysis. Over the past year, the patient states that he has done well from a cardiac standpoint. He denies any CP, chest discomfort or SOB. He reports occasional palpitations, as well as a productive cough and dysphagia. BP has been stable. EKG today shows NSR with no acute changes. The patient is compliant with his prescribed medications.      Last Recorded Vitals:  Vitals:    08/30/24 1428   BP: 126/60   BP Location: Left arm   Patient Position: Sitting   BP Cuff Size: Adult   Pulse: 64   SpO2: 95%   Weight: 105 kg (231 lb 12.8 oz)   Height: 1.676 m (5' 6\")       Past Surgical History:  He has a past surgical history that includes Total knee arthroplasty (05/12/2016); " Gastric bypass (07/17/2017); Other surgical history (07/17/2017); Coronary artery bypass graft (08/08/2017); CT angio head w and wo IV contrast (8/24/2017); and CT angio neck (8/24/2017).      Social History:  He reports that he quit smoking about 23 years ago. His smoking use included cigarettes. He has never used smokeless tobacco. He reports current alcohol use of about 14.0 standard drinks of alcohol per week. He reports that he does not use drugs.    Family History:  Family History   Problem Relation Name Age of Onset   • No Known Problems Mother     • No Known Problems Father          Allergies:  Sulfamethoxazole-trimethoprim    Outpatient Medications:  Current Outpatient Medications   Medication Instructions   • aspirin 81 mg, oral, Daily   • atorvastatin (LIPITOR) 40 mg, oral, Daily   • carvedilol (COREG) 25 mg, oral, 2 times daily   • cholecalciferol (VITAMIN D-3) 5,000 Units, oral   • clopidogrel (PLAVIX) 75 mg, oral, Daily   • cyanocobalamin, vitamin B-12, (Vitamin B-12) 1,000 mcg tablet extended release    • losartan (COZAAR) 50 mg, oral, Daily   • metFORMIN (GLUCOPHAGE) 1,000 mg, oral, 2 times daily   • multivitamin with minerals (multivit-min-iron fum-folic ac) tablet 1 tablet, oral, Daily   • tadalafil (CIALIS) 5 mg, oral, Daily   • tamsulosin (FLOMAX) 0.4 mg, oral, Daily     Review of Systems   Respiratory:  Positive for cough (productive).    Gastrointestinal:  Positive for dysphagia.   All other systems reviewed and are negative.     Physical Exam:  Constitutional:       Appearance: Healthy appearance. Not in distress.   Neck:      Vascular: No JVR. JVD normal.   Pulmonary:      Effort: Pulmonary effort is normal.      Breath sounds: Normal breath sounds. No wheezing. No rhonchi. No rales.   Chest:      Chest wall: Not tender to palpatation.   Cardiovascular:      PMI at left midclavicular line. Normal rate. Regular rhythm. Normal S1. Normal S2.       Murmurs: There is no murmur.      No gallop.   No click. No rub.   Pulses:     Intact distal pulses.   Edema:     Peripheral edema absent.   Abdominal:      General: Bowel sounds are normal.      Palpations: Abdomen is soft.      Tenderness: There is no abdominal tenderness.   Musculoskeletal: Normal range of motion.         General: No tenderness. Skin:     General: Skin is warm and dry.   Neurological:      General: No focal deficit present.      Mental Status: Alert and oriented to person, place and time.          Last Labs:  CBC -  Lab Results   Component Value Date    WBC 7.4 05/14/2024    HGB 13.4 (L) 05/14/2024    HCT 41.9 05/14/2024    MCV 95 05/14/2024     (L) 05/14/2024       CMP -  Lab Results   Component Value Date    CALCIUM 9.7 05/14/2024    PROT 7.0 05/14/2024    ALBUMIN 4.4 05/14/2024    AST 24 05/14/2024    ALT 27 05/14/2024    ALKPHOS 58 05/14/2024    BILITOT 0.6 05/14/2024       LIPID PANEL -   Lab Results   Component Value Date    CHOL 123 05/14/2024    TRIG 84 05/14/2024    HDL 40.7 05/14/2024    CHHDL 3.0 05/14/2024    LDLF 77 06/13/2023    VLDL 17 05/14/2024    NHDL 82 05/14/2024       RENAL FUNCTION PANEL -   Lab Results   Component Value Date    GLUCOSE 123 (H) 05/14/2024     05/14/2024    K 4.6 05/14/2024     (H) 05/14/2024    CO2 23 05/14/2024    ANIONGAP 12 05/14/2024    BUN 25 (H) 05/14/2024    CREATININE 0.90 05/14/2024    GFRMALE 84 05/12/2023    CALCIUM 9.7 05/14/2024    ALBUMIN 4.4 05/14/2024        Lab Results   Component Value Date    HGBA1C 5.6 05/14/2024       Last Cardiology Tests:  08/18/2022 - Vascular Lab Carotid Artery Duplex U/S   1. Right Carotid: Findings are consistent with less than 50% stenosis of the right proximal ICA. Right external carotid artery appears patent with no evidence of stenosis. The right vertebral artery is patent with antegrade flow. No evidence of hemodynamically significant stenosis in the right subclavian.  2. Left Carotid: Findings are consistent with an occlusion of the  left ICA. There are elevated velocities in the left ECA that are suggestive of disease. The left vertebral artery is patent with antegrade flow. No evidence of hemodynamically significant stenosis in the left subclavian.     09/07/2017 - TTE  1. Mild left ventricular systolic dysfunction with regional abnormalities with an ejection fraction of 45%.  2. Inferior, inferolateral, and anterolateral wall segments are hypokinetic.  3. Mild right ventricular systolic dysfunction.  4. Mild mitral regurgitation.  5. Trace tricuspid regurgitation.     Lab review: I have personally reviewed the laboratory result(s).    Assessment/Plan  1) CAD s/p CABG  On ASA 81 mg daily, atorvastatin 40 mg daily, carvedilol 25 mg BID, Plavix 75 mg daily, losartan 50 mg daily  Stress test and echo in  last year was negative  Lipid panel 05/14/2024 with total cholesterol, LDL and triglycerides of 123, 66 and 84 respectively   Denies CP, chest discomfort or SOB  Reports occasional palpitations  Reports productive cough and dysphagia   BP stable  EKG stable  Continue current medical Rx   Check echo  Check CXR  F/U after testing     2) Carotid Artery Disease, Hx of TIA s/p CEA  Carotid US stable   Check carotid duplex   F/U after testing      Scribe Attestation  By signing my name below, I, Lavell Robles   attest that this documentation has been prepared under the direction and in the presence of Tru Guerrero MD.

## 2024-09-03 ENCOUNTER — TELEPHONE (OUTPATIENT)
Dept: PRIMARY CARE | Facility: CLINIC | Age: 74
End: 2024-09-03
Payer: MEDICARE

## 2024-09-03 DIAGNOSIS — G47.33 OSA ON CPAP: Primary | ICD-10-CM

## 2024-09-14 DIAGNOSIS — R10.9 ACUTE RIGHT FLANK PAIN: ICD-10-CM

## 2024-09-14 DIAGNOSIS — E78.5 HYPERLIPIDEMIA, UNSPECIFIED HYPERLIPIDEMIA TYPE: ICD-10-CM

## 2024-09-14 DIAGNOSIS — Z95.1 S/P CABG (CORONARY ARTERY BYPASS GRAFT): ICD-10-CM

## 2024-09-14 DIAGNOSIS — R35.0 URINARY FREQUENCY: ICD-10-CM

## 2024-09-14 DIAGNOSIS — R73.03 PREDIABETES: ICD-10-CM

## 2024-09-14 DIAGNOSIS — I65.29 STENOSIS OF CAROTID ARTERY, UNSPECIFIED LATERALITY: ICD-10-CM

## 2024-09-14 DIAGNOSIS — I10 HTN (HYPERTENSION), BENIGN: Chronic | ICD-10-CM

## 2024-09-17 RX ORDER — CARVEDILOL 25 MG/1
25 TABLET ORAL 2 TIMES DAILY
Qty: 180 TABLET | Refills: 0 | Status: SHIPPED | OUTPATIENT
Start: 2024-09-17

## 2024-09-17 RX ORDER — CLOPIDOGREL BISULFATE 75 MG/1
75 TABLET ORAL DAILY
Qty: 90 TABLET | Refills: 0 | Status: SHIPPED | OUTPATIENT
Start: 2024-09-17

## 2024-09-17 RX ORDER — ATORVASTATIN CALCIUM 40 MG/1
40 TABLET, FILM COATED ORAL DAILY
Qty: 90 TABLET | Refills: 0 | Status: SHIPPED | OUTPATIENT
Start: 2024-09-17

## 2024-09-17 RX ORDER — LOSARTAN POTASSIUM 50 MG/1
50 TABLET ORAL DAILY
Qty: 90 TABLET | Refills: 0 | Status: SHIPPED | OUTPATIENT
Start: 2024-09-17

## 2024-09-17 RX ORDER — TAMSULOSIN HYDROCHLORIDE 0.4 MG/1
0.4 CAPSULE ORAL DAILY
Qty: 90 CAPSULE | Refills: 0 | Status: SHIPPED | OUTPATIENT
Start: 2024-09-17

## 2024-09-17 RX ORDER — METFORMIN HYDROCHLORIDE 1000 MG/1
1000 TABLET ORAL 2 TIMES DAILY
Qty: 180 TABLET | Refills: 0 | Status: SHIPPED | OUTPATIENT
Start: 2024-09-17

## 2024-09-19 ENCOUNTER — OFFICE VISIT (OUTPATIENT)
Dept: URGENT CARE | Age: 74
End: 2024-09-19
Payer: MEDICARE

## 2024-09-19 ENCOUNTER — TELEPHONE (OUTPATIENT)
Dept: PRIMARY CARE | Facility: CLINIC | Age: 74
End: 2024-09-19
Payer: MEDICARE

## 2024-09-19 VITALS
DIASTOLIC BLOOD PRESSURE: 76 MMHG | HEART RATE: 62 BPM | OXYGEN SATURATION: 97 % | TEMPERATURE: 98.5 F | RESPIRATION RATE: 24 BRPM | SYSTOLIC BLOOD PRESSURE: 163 MMHG

## 2024-09-19 DIAGNOSIS — M54.9 ACUTE RIGHT-SIDED BACK PAIN, UNSPECIFIED BACK LOCATION: Primary | ICD-10-CM

## 2024-09-19 LAB
POC APPEARANCE, URINE: CLEAR
POC BILIRUBIN, URINE: NEGATIVE
POC BLOOD, URINE: NEGATIVE
POC COLOR, URINE: YELLOW
POC GLUCOSE, URINE: NEGATIVE MG/DL
POC KETONES, URINE: NEGATIVE MG/DL
POC LEUKOCYTES, URINE: NEGATIVE
POC NITRITE,URINE: NEGATIVE
POC PH, URINE: 6 PH
POC PROTEIN, URINE: NEGATIVE MG/DL
POC SPECIFIC GRAVITY, URINE: 1.02
POC UROBILINOGEN, URINE: 0.2 EU/DL

## 2024-09-19 RX ORDER — LIDOCAINE 560 MG/1
1 PATCH PERCUTANEOUS; TOPICAL; TRANSDERMAL DAILY
Qty: 10 PATCH | Refills: 0 | Status: SHIPPED | OUTPATIENT
Start: 2024-09-19

## 2024-09-19 RX ORDER — KETOROLAC TROMETHAMINE 30 MG/ML
15 INJECTION, SOLUTION INTRAMUSCULAR; INTRAVENOUS ONCE
Status: COMPLETED | OUTPATIENT
Start: 2024-09-19 | End: 2024-09-19

## 2024-09-19 RX ORDER — CYCLOBENZAPRINE HCL 5 MG
5 TABLET ORAL 2 TIMES DAILY PRN
Qty: 10 TABLET | Refills: 0 | Status: SHIPPED | OUTPATIENT
Start: 2024-09-19

## 2024-09-19 RX ORDER — NAPROXEN 500 MG/1
500 TABLET ORAL
Qty: 10 TABLET | Refills: 0 | Status: SHIPPED | OUTPATIENT
Start: 2024-09-19

## 2024-09-19 ASSESSMENT — ENCOUNTER SYMPTOMS: BACK PAIN: 1

## 2024-09-19 NOTE — PROGRESS NOTES
Subjective   Patient ID: Rommel Thompson is a 73 y.o. male. They present today with a chief complaint of Back Pain (Right sided, non radiating, for one week. Denies injury).    History of Present Illness    Back Pain      73-year-old male with a past medical history of BPH, hypertension, GERD, high cholesterol, prediabetes, CAD status post CABG, and previous TIA presents to urgent care with his wife for evaluation of atraumatic right sided back pain.  Patient reports that pain began about a week ago.  Denies any injuries or new activities.  Reports that pain is worse with changes in position or with walking.  He denies any saddle anesthesia, urinary retention, or incontinence of urine or stool.  No numbness or weakness in the lower extremities.  Denies any dysuria, hematuria, or history of kidney stones.  No testicular pain or swelling.  No abdominal pain, nausea, or vomiting does report that he noted some indigestion today.  No diarrhea, blood in stool, or changes in bowel habits.  No fevers or bodyaches.  No rashes.  Took Tylenol last night with some improvement in pain.    History  Allergies as of 09/19/2024 - Reviewed 09/19/2024   Allergen Reaction Noted    Sulfamethoxazole-trimethoprim Unknown 05/09/2023       (Not in a hospital admission)       Past Medical History:   Diagnosis Date    Bilateral primary osteoarthritis of knee 06/28/2022    Osteoarthritis of knees, bilateral    Carpal tunnel syndrome, left upper limb 01/06/2023    Carpal tunnel syndrome of left wrist    Essential (primary) hypertension 08/09/2022    Benign essential HTN    Gastro-esophageal reflux disease without esophagitis 06/28/2022    GERD (gastroesophageal reflux disease)    Hyperlipidemia, unspecified 08/09/2022    Hyperlipemia    Male erectile dysfunction, unspecified 08/21/2016    Erectile dysfunction    Morbid (severe) obesity due to excess calories (Multi) 08/09/2022    Class 2 severe obesity with serious comorbidity and body mass index  (BMI) of 37.0 to 37.9 in adult, unspecified obesity type    Obstructive sleep apnea (adult) (pediatric) 09/08/2022    ALVIN on CPAP    Other conditions influencing health status 08/21/2016    Normal echocardiogram    Personal history of other diseases of the circulatory system     History of hypertension    Personal history of other endocrine, nutritional and metabolic disease 08/21/2016    History of diabetes mellitus    Personal history of urinary (tract) infections 06/29/2016    History of urinary tract infection    Prediabetes 08/09/2022    Prediabetes       Past Surgical History:   Procedure Laterality Date    CORONARY ARTERY BYPASS GRAFT  08/08/2017    CABG    CT ANGIO NECK  8/24/2017    CT NECK ANGIO W AND WO IV CONTRAST 8/24/2017 [facility]    CT HEAD ANGIO W AND WO IV CONTRAST  8/24/2017    CT HEAD ANGIO W AND WO IV CONTRAST 8/24/2017 [facility]    GASTRIC BYPASS  07/17/2017    High Gastric Bypass    OTHER SURGICAL HISTORY  07/17/2017    Carotid Artery Catheterization    TOTAL KNEE ARTHROPLASTY  05/12/2016    Knee Replacement        reports that he quit smoking about 23 years ago. His smoking use included cigarettes. He has never used smokeless tobacco. He reports current alcohol use of about 14.0 standard drinks of alcohol per week. He reports that he does not use drugs.    Review of Systems  Review of Systems   Musculoskeletal:  Positive for back pain.   All other systems reviewed and are negative.                                 Objective    Vitals:    09/19/24 1647   BP: 163/76   Pulse: 62   Resp: 24   Temp: 36.9 °C (98.5 °F)   SpO2: 97%     No LMP for male patient.    Physical Exam  Vitals reviewed.   Constitutional:       Appearance: Normal appearance.   Cardiovascular:      Pulses:           Radial pulses are 2+ on the right side and 2+ on the left side.        Posterior tibial pulses are 2+ on the right side and 2+ on the left side.   Pulmonary:      Effort: Pulmonary effort is  normal.   Abdominal:      General: There is no distension.      Palpations: Abdomen is soft. There is no mass.      Tenderness: There is abdominal tenderness (Mild, right side of the abdomen and right flank region.). There is no right CVA tenderness, left CVA tenderness or guarding.   Musculoskeletal:      Thoracic back: Normal.      Lumbar back: Normal.      Right lower leg: No edema.      Left lower leg: No edema.      Comments: No appreciable midline tenderness on palpation of the back.  No step-offs, crepitus, or bony instability.  No appreciable paraspinal muscle tenderness.  Patient ambulates with a steady gait.  No rashes or bruising.  Patient exhibits 5-5 strength in the bilateral lower extremities.  Patient can stand on the heels and toes.   Skin:     General: Skin is warm and dry.   Neurological:      Mental Status: He is alert and oriented to person, place, and time.   Psychiatric:         Mood and Affect: Mood normal.         Behavior: Behavior normal.         Procedures    Point of Care Test & Imaging Results from this visit  No results found for this visit on 09/19/24.   No results found.    Diagnostic study results (if any) were reviewed by Alex Agarwal PA-C.    Assessment/Plan   Allergies, medications, history, and pertinent labs/EKGs/Imaging reviewed by Alex Agarwal PA-C.     Medical Decision Making  Patient is afebrile.  He is slightly hypertensive but remaining vitals are normal.  He has no acute distress.  Ambulates with a steady but antalgic gait and reports pain is worse with sitting and standing, walking, and changes in positions.  He has no neurovascular compromise in the lower extremities.  No pulsatile mass in the abdomen.  He has no CVA tenderness.  No history of kidney stones.  No evidence of UTI or kidney stone on his UA.  No GI symptoms.  No cauda equina signs.  Suspect this is likely muscular.  He was given Toradol here at urgent care.  Discharged with prescriptions for naproxen,  lidocaine patches, and Flexeril.  Patient and his wife were instructed to go immediately to the ED for any worsening pain, fevers, blood in the stool, or any other concerns.    Orders and Diagnoses  Diagnoses and all orders for this visit:  Acute right-sided back pain, unspecified back location  -     POCT UA Automated manually resulted  -     ketorolac (Toradol) injection 15 mg      Medical Admin Record      Patient disposition: Home    Electronically signed by Alex Agarwal PA-C  5:16 PM

## 2024-09-27 ENCOUNTER — HOSPITAL ENCOUNTER (OUTPATIENT)
Dept: VASCULAR MEDICINE | Facility: HOSPITAL | Age: 74
Discharge: HOME | End: 2024-09-27
Payer: MEDICARE

## 2024-09-27 ENCOUNTER — HOSPITAL ENCOUNTER (OUTPATIENT)
Dept: CARDIOLOGY | Facility: HOSPITAL | Age: 74
Discharge: HOME | End: 2024-09-27
Payer: MEDICARE

## 2024-09-27 DIAGNOSIS — I42.9 CARDIOMYOPATHY: ICD-10-CM

## 2024-09-27 DIAGNOSIS — I65.23 CAROTID ARTERY STENOSIS WITHOUT CEREBRAL INFARCTION, BILATERAL: ICD-10-CM

## 2024-09-27 DIAGNOSIS — I65.29 CAROTID ARTERY STENOSIS: ICD-10-CM

## 2024-09-27 DIAGNOSIS — I25.10 CORONARY ARTERY DISEASE INVOLVING NATIVE CORONARY ARTERY OF NATIVE HEART, UNSPECIFIED WHETHER ANGINA PRESENT: ICD-10-CM

## 2024-09-27 PROCEDURE — 93880 EXTRACRANIAL BILAT STUDY: CPT | Performed by: INTERNAL MEDICINE

## 2024-09-27 PROCEDURE — 93306 TTE W/DOPPLER COMPLETE: CPT

## 2024-09-27 PROCEDURE — 93880 EXTRACRANIAL BILAT STUDY: CPT

## 2024-09-27 PROCEDURE — 2500000004 HC RX 250 GENERAL PHARMACY W/ HCPCS (ALT 636 FOR OP/ED): Performed by: INTERNAL MEDICINE

## 2024-09-27 PROCEDURE — 93306 TTE W/DOPPLER COMPLETE: CPT | Performed by: STUDENT IN AN ORGANIZED HEALTH CARE EDUCATION/TRAINING PROGRAM

## 2024-09-29 LAB
AORTIC VALVE MEAN GRADIENT: 15 MMHG
AORTIC VALVE PEAK VELOCITY: 2.6 M/S
AV PEAK GRADIENT: 26.9 MMHG
AVA (PEAK VEL): 1.3 CM2
AVA (VTI): 1.29 CM2
EJECTION FRACTION APICAL 4 CHAMBER: 69.9
EJECTION FRACTION: 58 %
LEFT ATRIUM VOLUME AREA LENGTH INDEX BSA: 24.7 ML/M2
LEFT VENTRICLE INTERNAL DIMENSION DIASTOLE: 4.7 CM (ref 3.5–6)
LEFT VENTRICULAR OUTFLOW TRACT DIAMETER: 1.9 CM
LV EJECTION FRACTION BIPLANE: 66 %
MITRAL VALVE E/A RATIO: 3.44
MITRAL VALVE E/E' RATIO: 20.6
RIGHT VENTRICLE FREE WALL PEAK S': 12 CM/S
RIGHT VENTRICLE PEAK SYSTOLIC PRESSURE: 40.2 MMHG
TRICUSPID ANNULAR PLANE SYSTOLIC EXCURSION: 2.9 CM

## 2024-09-29 ASSESSMENT — ENCOUNTER SYMPTOMS: COUGH: 1

## 2024-09-30 ENCOUNTER — OFFICE VISIT (OUTPATIENT)
Dept: CARDIOLOGY | Facility: HOSPITAL | Age: 74
End: 2024-09-30
Payer: MEDICARE

## 2024-09-30 VITALS
DIASTOLIC BLOOD PRESSURE: 76 MMHG | BODY MASS INDEX: 37.12 KG/M2 | HEART RATE: 58 BPM | SYSTOLIC BLOOD PRESSURE: 150 MMHG | HEIGHT: 66 IN | WEIGHT: 231 LBS

## 2024-09-30 DIAGNOSIS — Z95.1 S/P CABG (CORONARY ARTERY BYPASS GRAFT): ICD-10-CM

## 2024-09-30 DIAGNOSIS — I65.23 BILATERAL CAROTID ARTERY STENOSIS: Primary | ICD-10-CM

## 2024-09-30 DIAGNOSIS — I25.10 CORONARY ARTERY DISEASE INVOLVING NATIVE CORONARY ARTERY OF NATIVE HEART WITHOUT ANGINA PECTORIS: ICD-10-CM

## 2024-09-30 DIAGNOSIS — I35.0 NONRHEUMATIC AORTIC VALVE STENOSIS: ICD-10-CM

## 2024-09-30 PROCEDURE — 1036F TOBACCO NON-USER: CPT | Performed by: INTERNAL MEDICINE

## 2024-09-30 PROCEDURE — 99213 OFFICE O/P EST LOW 20 MIN: CPT | Performed by: INTERNAL MEDICINE

## 2024-09-30 PROCEDURE — 1159F MED LIST DOCD IN RCRD: CPT | Performed by: INTERNAL MEDICINE

## 2024-09-30 PROCEDURE — 1157F ADVNC CARE PLAN IN RCRD: CPT | Performed by: INTERNAL MEDICINE

## 2024-09-30 PROCEDURE — 3078F DIAST BP <80 MM HG: CPT | Performed by: INTERNAL MEDICINE

## 2024-09-30 PROCEDURE — 3008F BODY MASS INDEX DOCD: CPT | Performed by: INTERNAL MEDICINE

## 2024-09-30 PROCEDURE — 1123F ACP DISCUSS/DSCN MKR DOCD: CPT | Performed by: INTERNAL MEDICINE

## 2024-09-30 PROCEDURE — 3077F SYST BP >= 140 MM HG: CPT | Performed by: INTERNAL MEDICINE

## 2024-09-30 ASSESSMENT — ENCOUNTER SYMPTOMS
DEPRESSION: 1
OCCASIONAL FEELINGS OF UNSTEADINESS: 0
LOSS OF SENSATION IN FEET: 0

## 2024-09-30 NOTE — PROGRESS NOTES
"Counseling:  The patient was counseled regarding diagnostic results, instructions for management, risk factor reductions, prognosis, patient and family education, impressions, risks and benefits of treatment options and importance of compliance with treatment.      Chief Complaint:   The patient presents today for 1-month followup of carotid artery disease and CAD s/p carotid duplex and echocardiogram.      History Of Present Illness:    Rommel Thompson is a 73 year old male patient who presents today in the company of his wife for 1-month followup of carotid artery disease and CAD s/p carotid duplex and echocardiogram. His PMH is significant for HTN, CAD s/p CABG 2017, Right CEA 2017, CMP, GERD, hyperlipidemia, ALVIN (CPAP), h/o TIA and vocal cord paralysis. On 09/27/2024, carotid duplex revealed <50% stenosis of the right proximal ICA and occlusion of the left ICA, and echocardiogram demonstrated an EF of 55-60%, abnormal pattern of left ventricular diastolic filling, elevated LVEDP, normal RV systolic function, mildly elevated RVSP, and moderate aortic stenosis. Today, the patient states that he is feeling well with no new cardiac complaints.     Last Recorded Vitals:  Vitals:    09/30/24 1547   BP: 150/76   BP Location: Left arm   Pulse: 58   Weight: 105 kg (231 lb)   Height: 1.676 m (5' 6\")         Past Surgical History:  He has a past surgical history that includes Total knee arthroplasty (05/12/2016); Gastric bypass (07/17/2017); Other surgical history (07/17/2017); Coronary artery bypass graft (08/08/2017); CT angio head w and wo IV contrast (8/24/2017); and CT angio neck (8/24/2017).      Social History:  He reports that he quit smoking about 23 years ago. His smoking use included cigarettes. He has never used smokeless tobacco. He reports current alcohol use of about 14.0 standard drinks of alcohol per week. He reports that he does not use drugs.    Family History:  Family History   Problem Relation Name Age of " Onset    No Known Problems Mother      No Known Problems Father          Allergies:  Sulfamethoxazole-trimethoprim    Outpatient Medications:  Current Outpatient Medications   Medication Instructions    aspirin 81 mg, oral, Daily    atorvastatin (LIPITOR) 40 mg, oral, Daily    carvedilol (COREG) 25 mg, oral, 2 times daily    cholecalciferol (VITAMIN D-3) 5,000 Units, oral    clopidogrel (PLAVIX) 75 mg, oral, Daily    cyanocobalamin, vitamin B-12, (Vitamin B-12) 1,000 mcg tablet extended release     cyclobenzaprine (FLEXERIL) 5 mg, oral, 2 times daily PRN    lidocaine 4 % patch 1 patch, transdermal, Daily, Remove & discard patch within 12 hours or as directed by MD.    losartan (COZAAR) 50 mg, oral, Daily    metFORMIN (GLUCOPHAGE) 1,000 mg, oral, 2 times daily    multivitamin with minerals (multivit-min-iron fum-folic ac) tablet 1 tablet, oral, Daily    naproxen (EC-NAPROSYN) 500 mg, oral, 2 times daily (morning and late afternoon), Do not crush, chew, or split.    tadalafil (CIALIS) 5 mg, oral, Daily    tamsulosin (FLOMAX) 0.4 mg, oral, Daily     Review of Systems   Respiratory:  Positive for cough (productive).    Gastrointestinal:  Positive for dysphagia.   All other systems reviewed and are negative.     Physical Exam:  Constitutional:       Appearance: Healthy appearance. Not in distress.   Neck:      Vascular: No JVR. JVD normal.   Pulmonary:      Effort: Pulmonary effort is normal.      Breath sounds: Normal breath sounds. No wheezing. No rhonchi. No rales.   Chest:      Chest wall: Not tender to palpatation.   Cardiovascular:      PMI at left midclavicular line. Normal rate. Regular rhythm. Normal S1. Normal S2.       Murmurs: There is no murmur.      No gallop.  No click. No rub.   Pulses:     Intact distal pulses.   Edema:     Peripheral edema absent.   Abdominal:      General: Bowel sounds are normal.      Palpations: Abdomen is soft.      Tenderness: There is no abdominal tenderness.   Musculoskeletal:  Normal range of motion.         General: No tenderness. Skin:     General: Skin is warm and dry.   Neurological:      General: No focal deficit present.      Mental Status: Alert and oriented to person, place and time.          Last Labs:  CBC -  Lab Results   Component Value Date    WBC 7.4 05/14/2024    HGB 13.4 (L) 05/14/2024    HCT 41.9 05/14/2024    MCV 95 05/14/2024     (L) 05/14/2024       CMP -  Lab Results   Component Value Date    CALCIUM 9.7 05/14/2024    PROT 7.0 05/14/2024    ALBUMIN 4.4 05/14/2024    AST 24 05/14/2024    ALT 27 05/14/2024    ALKPHOS 58 05/14/2024    BILITOT 0.6 05/14/2024       LIPID PANEL -   Lab Results   Component Value Date    CHOL 123 05/14/2024    TRIG 84 05/14/2024    HDL 40.7 05/14/2024    CHHDL 3.0 05/14/2024    LDLF 77 06/13/2023    VLDL 17 05/14/2024    NHDL 82 05/14/2024       RENAL FUNCTION PANEL -   Lab Results   Component Value Date    GLUCOSE 123 (H) 05/14/2024     05/14/2024    K 4.6 05/14/2024     (H) 05/14/2024    CO2 23 05/14/2024    ANIONGAP 12 05/14/2024    BUN 25 (H) 05/14/2024    CREATININE 0.90 05/14/2024    GFRMALE 84 05/12/2023    CALCIUM 9.7 05/14/2024    ALBUMIN 4.4 05/14/2024        Lab Results   Component Value Date    HGBA1C 5.6 05/14/2024       Last Cardiology Tests:  09/27/2024 - TTE  1. Left ventricular ejection fraction is normal, by visual estimate at 55-60%.  2. Spectral Doppler shows an abnormal pattern of left ventricular diastolic filling.  3. There is an elevated left ventricular end diastolic pressure.  4. There is normal right ventricular global systolic function.  5. Mildly elevated right ventricular systolic pressure.  6. Moderate aortic valve stenosis.  7. There is moderate aortic valve cusp calcification.    09/27/2024 - Vascular Lab Carotid Artery Duplex    1. Right Carotid: Findings are consistent with less than 50% stenosis of the right proximal internal carotid artery. Right external carotid artery appears patent  with no evidence of stenosis. The right vertebral artery is patent with antegrade flow. No evidence of hemodynamically significant stenosis in the right subclavian artery.  2. Left Carotid: Findings are consistent with an occlusion of the left internal carotid artery. Left external carotid artery appears patent with no evidence of stenosis. The left vertebral artery is patent with antegrade flow. No evidence of hemodynamically significant stenosis in the left subclavian artery.    08/18/2022 - Vascular Lab Carotid Artery Duplex U/S   1. Right Carotid: Findings are consistent with less than 50% stenosis of the right proximal ICA. Right external carotid artery appears patent with no evidence of stenosis. The right vertebral artery is patent with antegrade flow. No evidence of hemodynamically significant stenosis in the right subclavian.  2. Left Carotid: Findings are consistent with an occlusion of the left ICA. There are elevated velocities in the left ECA that are suggestive of disease. The left vertebral artery is patent with antegrade flow. No evidence of hemodynamically significant stenosis in the left subclavian.     09/07/2017 - TTE  1. Mild left ventricular systolic dysfunction with regional abnormalities with an ejection fraction of 45%.  2. Inferior, inferolateral, and anterolateral wall segments are hypokinetic.  3. Mild right ventricular systolic dysfunction.  4. Mild mitral regurgitation.  5. Trace tricuspid regurgitation.     Diagnostic review: I have personally reviewed the result(s) of the Echocardiogram and Carotid Duplex.     Assessment/Plan   1) CAD s/p CABG  On DAPT with ASA 81 mg daily and Plavix 75 mg daily   On atorvastatin 40 mg daily, carvedilol 25 mg BID, losartan 50 mg daily  Stress test and echo in  last year was negative  Lipid panel 05/14/2024 with total cholesterol, LDL and triglycerides of 123, 66 and 84 respectively   Denies CP, chest discomfort or SOB  Reports occasional  palpitations  Reports productive cough and dysphagia   CXR 08/30/2024 negative  TTE 09/27/2024 with LVEF 55-60%, abnormal pattern of left ventricular diastolic filling, elevated LVEDP, normal RV systolic function, mildly elevated RVSP, and moderate aortic stenosis.   Increase physical activity and monitor diet - Mediterranean style diet  Continue current medical Rx   Repeat echo 1 year   Repeat stress test 1 year  F/U 1 year     2) Carotid Artery Disease, Hx of TIA s/p CEA  Carotid duplex 09/27/2024 with <50% stenosis of right proximal ICA, occlusion of left ICA  Monitor with annual carotid duplex  Repeat carotid duplex 1 year  F/U 1 year    3) Aortic Stenosis  TTE 09/27/2024 with moderate aortic stenosis  Monitor with annual echo   Repeat echo 1 year   F/U 1 year      Scribe Attestation  By signing my name below, IIvone Scribe   attest that this documentation has been prepared under the direction and in the presence of Tru Guerrero MD.

## 2024-09-30 NOTE — LETTER
"September 30, 2024     Pilo Marquez MD  [address]  [facility], [address]  [facility] [address]    Patient: Rommel Thompson   YOB: 1950   Date of Visit: 9/30/2024       Dear Dr. Pilo Marquez MD:    Thank you for referring Rommel Thompson to me for evaluation. Below are my notes for this consultation.  If you have questions, please do not hesitate to call me. I look forward to following your patient along with you.       Sincerely,     Tru Guerrero MD      CC: No Recipients  ______________________________________________________________________________________    Counseling:  The patient was counseled regarding diagnostic results, instructions for management, risk factor reductions, prognosis, patient and family education, impressions, risks and benefits of treatment options and importance of compliance with treatment.      Chief Complaint:   The patient presents today for 1-month followup of carotid artery disease and CAD s/p carotid duplex and echocardiogram.      History Of Present Illness:    Rommel Thompson is a 73 year old male patient who presents today in the company of his wife for 1-month followup of carotid artery disease and CAD s/p carotid duplex and echocardiogram. His PMH is significant for HTN, CAD s/p CABG 2017, Right CEA 2017, CMP, GERD, hyperlipidemia, ALVIN (CPAP), h/o TIA and vocal cord paralysis. On 09/27/2024, carotid duplex revealed <50% stenosis of the right proximal ICA and occlusion of the left ICA, and echocardiogram demonstrated an EF of 55-60%, abnormal pattern of left ventricular diastolic filling, elevated LVEDP, normal RV systolic function, mildly elevated RVSP, and moderate aortic stenosis. Today, the patient states that he is feeling well with no new cardiac complaints.     Last Recorded Vitals:  Vitals:    09/30/24 1547   BP: 150/76   BP Location: Left arm   Pulse: 58   Weight: 105 kg (231 lb)   Height: 1.676 m (5' 6\")         Past Surgical History:  He has a past " surgical history that includes Total knee arthroplasty (05/12/2016); Gastric bypass (07/17/2017); Other surgical history (07/17/2017); Coronary artery bypass graft (08/08/2017); CT angio head w and wo IV contrast (8/24/2017); and CT angio neck (8/24/2017).      Social History:  He reports that he quit smoking about 23 years ago. His smoking use included cigarettes. He has never used smokeless tobacco. He reports current alcohol use of about 14.0 standard drinks of alcohol per week. He reports that he does not use drugs.    Family History:  Family History   Problem Relation Name Age of Onset   • No Known Problems Mother     • No Known Problems Father          Allergies:  Sulfamethoxazole-trimethoprim    Outpatient Medications:  Current Outpatient Medications   Medication Instructions   • aspirin 81 mg, oral, Daily   • atorvastatin (LIPITOR) 40 mg, oral, Daily   • carvedilol (COREG) 25 mg, oral, 2 times daily   • cholecalciferol (VITAMIN D-3) 5,000 Units, oral   • clopidogrel (PLAVIX) 75 mg, oral, Daily   • cyanocobalamin, vitamin B-12, (Vitamin B-12) 1,000 mcg tablet extended release    • cyclobenzaprine (FLEXERIL) 5 mg, oral, 2 times daily PRN   • lidocaine 4 % patch 1 patch, transdermal, Daily, Remove & discard patch within 12 hours or as directed by MD.   • losartan (COZAAR) 50 mg, oral, Daily   • metFORMIN (GLUCOPHAGE) 1,000 mg, oral, 2 times daily   • multivitamin with minerals (multivit-min-iron fum-folic ac) tablet 1 tablet, oral, Daily   • naproxen (EC-NAPROSYN) 500 mg, oral, 2 times daily (morning and late afternoon), Do not crush, chew, or split.   • tadalafil (CIALIS) 5 mg, oral, Daily   • tamsulosin (FLOMAX) 0.4 mg, oral, Daily     Review of Systems   Respiratory:  Positive for cough (productive).    Gastrointestinal:  Positive for dysphagia.   All other systems reviewed and are negative.     Physical Exam:  Constitutional:       Appearance: Healthy appearance. Not in distress.   Neck:      Vascular: No  JVR. JVD normal.   Pulmonary:      Effort: Pulmonary effort is normal.      Breath sounds: Normal breath sounds. No wheezing. No rhonchi. No rales.   Chest:      Chest wall: Not tender to palpatation.   Cardiovascular:      PMI at left midclavicular line. Normal rate. Regular rhythm. Normal S1. Normal S2.       Murmurs: There is no murmur.      No gallop.  No click. No rub.   Pulses:     Intact distal pulses.   Edema:     Peripheral edema absent.   Abdominal:      General: Bowel sounds are normal.      Palpations: Abdomen is soft.      Tenderness: There is no abdominal tenderness.   Musculoskeletal: Normal range of motion.         General: No tenderness. Skin:     General: Skin is warm and dry.   Neurological:      General: No focal deficit present.      Mental Status: Alert and oriented to person, place and time.          Last Labs:  CBC -  Lab Results   Component Value Date    WBC 7.4 05/14/2024    HGB 13.4 (L) 05/14/2024    HCT 41.9 05/14/2024    MCV 95 05/14/2024     (L) 05/14/2024       CMP -  Lab Results   Component Value Date    CALCIUM 9.7 05/14/2024    PROT 7.0 05/14/2024    ALBUMIN 4.4 05/14/2024    AST 24 05/14/2024    ALT 27 05/14/2024    ALKPHOS 58 05/14/2024    BILITOT 0.6 05/14/2024       LIPID PANEL -   Lab Results   Component Value Date    CHOL 123 05/14/2024    TRIG 84 05/14/2024    HDL 40.7 05/14/2024    CHHDL 3.0 05/14/2024    LDLF 77 06/13/2023    VLDL 17 05/14/2024    NHDL 82 05/14/2024       RENAL FUNCTION PANEL -   Lab Results   Component Value Date    GLUCOSE 123 (H) 05/14/2024     05/14/2024    K 4.6 05/14/2024     (H) 05/14/2024    CO2 23 05/14/2024    ANIONGAP 12 05/14/2024    BUN 25 (H) 05/14/2024    CREATININE 0.90 05/14/2024    GFRMALE 84 05/12/2023    CALCIUM 9.7 05/14/2024    ALBUMIN 4.4 05/14/2024        Lab Results   Component Value Date    HGBA1C 5.6 05/14/2024       Last Cardiology Tests:  09/27/2024 - TTE  1. Left ventricular ejection fraction is normal, by  visual estimate at 55-60%.  2. Spectral Doppler shows an abnormal pattern of left ventricular diastolic filling.  3. There is an elevated left ventricular end diastolic pressure.  4. There is normal right ventricular global systolic function.  5. Mildly elevated right ventricular systolic pressure.  6. Moderate aortic valve stenosis.  7. There is moderate aortic valve cusp calcification.    09/27/2024 - Vascular Lab Carotid Artery Duplex    1. Right Carotid: Findings are consistent with less than 50% stenosis of the right proximal internal carotid artery. Right external carotid artery appears patent with no evidence of stenosis. The right vertebral artery is patent with antegrade flow. No evidence of hemodynamically significant stenosis in the right subclavian artery.  2. Left Carotid: Findings are consistent with an occlusion of the left internal carotid artery. Left external carotid artery appears patent with no evidence of stenosis. The left vertebral artery is patent with antegrade flow. No evidence of hemodynamically significant stenosis in the left subclavian artery.    08/18/2022 - Vascular Lab Carotid Artery Duplex U/S   1. Right Carotid: Findings are consistent with less than 50% stenosis of the right proximal ICA. Right external carotid artery appears patent with no evidence of stenosis. The right vertebral artery is patent with antegrade flow. No evidence of hemodynamically significant stenosis in the right subclavian.  2. Left Carotid: Findings are consistent with an occlusion of the left ICA. There are elevated velocities in the left ECA that are suggestive of disease. The left vertebral artery is patent with antegrade flow. No evidence of hemodynamically significant stenosis in the left subclavian.     09/07/2017 - TTE  1. Mild left ventricular systolic dysfunction with regional abnormalities with an ejection fraction of 45%.  2. Inferior, inferolateral, and anterolateral wall segments are  hypokinetic.  3. Mild right ventricular systolic dysfunction.  4. Mild mitral regurgitation.  5. Trace tricuspid regurgitation.     Diagnostic review: I have personally reviewed the result(s) of the Echocardiogram and Carotid Duplex.     Assessment/Plan  1) CAD s/p CABG  On DAPT with ASA 81 mg daily and Plavix 75 mg daily   On atorvastatin 40 mg daily, carvedilol 25 mg BID, losartan 50 mg daily  Stress test and echo in  last year was negative  Lipid panel 05/14/2024 with total cholesterol, LDL and triglycerides of 123, 66 and 84 respectively   Denies CP, chest discomfort or SOB  Reports occasional palpitations  Reports productive cough and dysphagia   CXR 08/30/2024 negative  TTE 09/27/2024 with LVEF 55-60%, abnormal pattern of left ventricular diastolic filling, elevated LVEDP, normal RV systolic function, mildly elevated RVSP, and moderate aortic stenosis.   Increase physical activity and monitor diet - Mediterranean style diet  Continue current medical Rx   Repeat echo 1 year   Repeat stress test 1 year  F/U 1 year     2) Carotid Artery Disease, Hx of TIA s/p CEA  Carotid duplex 09/27/2024 with <50% stenosis of right proximal ICA, occlusion of left ICA  Monitor with annual carotid duplex  Repeat carotid duplex 1 year  F/U 1 year    3) Aortic Stenosis  TTE 09/27/2024 with moderate aortic stenosis  Monitor with annual echo   Repeat echo 1 year   F/U 1 year      Scribe Attestation  By signing my name below, IIvone Scribe   attest that this documentation has been prepared under the direction and in the presence of Tru Guerrero MD.

## 2024-09-30 NOTE — PATIENT INSTRUCTIONS
Continue all current medications as prescribed.  Watch carbohydrate intake (rice, potatoes, pasta, bread, ice-cream all within moderation); increase greens, veggies, fiber, lean protein (fish, turkey, chicken; baked/boiled/grilled, not fried) and fruit.   Repeat stress test in 1 year.  Repeat echocardiogram (ultrasound of the heart) in 1 year to followup on your heart function and structure.   Repeat carotid ultrasound in 1 year.  Followup with Dr. Guerrero in 1 year, sooner should any issues or concerns arise before then.     If you have any questions or cardiac concerns, please call our office at [phone].

## 2024-11-05 ENCOUNTER — DOCUMENTATION (OUTPATIENT)
Dept: PHYSICAL THERAPY | Facility: HOSPITAL | Age: 74
End: 2024-11-05
Payer: MEDICARE

## 2024-11-05 NOTE — PROGRESS NOTES
Physical Therapy    Discharge Summary    Name: Rommel Thompson  MRN: [id number]  : 1950  Date: 24    Discharge Summary: PT    Date of PT eval 24  Date of last PT visit 24  Total visit #2  Discharge PT , Plan of care

## 2024-11-06 ENCOUNTER — APPOINTMENT (OUTPATIENT)
Dept: PRIMARY CARE | Facility: CLINIC | Age: 74
End: 2024-11-06
Payer: MEDICARE

## 2024-11-06 VITALS
DIASTOLIC BLOOD PRESSURE: 79 MMHG | HEIGHT: 66 IN | SYSTOLIC BLOOD PRESSURE: 157 MMHG | BODY MASS INDEX: 35.84 KG/M2 | TEMPERATURE: 96.8 F | WEIGHT: 223 LBS | HEART RATE: 52 BPM | OXYGEN SATURATION: 95 % | RESPIRATION RATE: 16 BRPM

## 2024-11-06 DIAGNOSIS — E78.5 HYPERLIPIDEMIA, UNSPECIFIED HYPERLIPIDEMIA TYPE: ICD-10-CM

## 2024-11-06 DIAGNOSIS — I65.29 STENOSIS OF CAROTID ARTERY, UNSPECIFIED LATERALITY: ICD-10-CM

## 2024-11-06 DIAGNOSIS — G47.33 OSA ON CPAP: ICD-10-CM

## 2024-11-06 DIAGNOSIS — Z71.89 ACP (ADVANCE CARE PLANNING): ICD-10-CM

## 2024-11-06 DIAGNOSIS — R73.03 PREDIABETES: ICD-10-CM

## 2024-11-06 DIAGNOSIS — R10.9 ACUTE RIGHT FLANK PAIN: ICD-10-CM

## 2024-11-06 DIAGNOSIS — R35.0 URINARY FREQUENCY: ICD-10-CM

## 2024-11-06 DIAGNOSIS — Z95.1 S/P CABG (CORONARY ARTERY BYPASS GRAFT): ICD-10-CM

## 2024-11-06 DIAGNOSIS — Z00.00 ROUTINE GENERAL MEDICAL EXAMINATION AT HEALTH CARE FACILITY: Primary | ICD-10-CM

## 2024-11-06 DIAGNOSIS — I10 HTN (HYPERTENSION), BENIGN: Chronic | ICD-10-CM

## 2024-11-06 PROCEDURE — 1170F FXNL STATUS ASSESSED: CPT | Performed by: STUDENT IN AN ORGANIZED HEALTH CARE EDUCATION/TRAINING PROGRAM

## 2024-11-06 PROCEDURE — 3078F DIAST BP <80 MM HG: CPT | Performed by: STUDENT IN AN ORGANIZED HEALTH CARE EDUCATION/TRAINING PROGRAM

## 2024-11-06 PROCEDURE — 1160F RVW MEDS BY RX/DR IN RCRD: CPT | Performed by: STUDENT IN AN ORGANIZED HEALTH CARE EDUCATION/TRAINING PROGRAM

## 2024-11-06 PROCEDURE — 3008F BODY MASS INDEX DOCD: CPT | Performed by: STUDENT IN AN ORGANIZED HEALTH CARE EDUCATION/TRAINING PROGRAM

## 2024-11-06 PROCEDURE — 99214 OFFICE O/P EST MOD 30 MIN: CPT | Performed by: STUDENT IN AN ORGANIZED HEALTH CARE EDUCATION/TRAINING PROGRAM

## 2024-11-06 PROCEDURE — 3077F SYST BP >= 140 MM HG: CPT | Performed by: STUDENT IN AN ORGANIZED HEALTH CARE EDUCATION/TRAINING PROGRAM

## 2024-11-06 PROCEDURE — 1036F TOBACCO NON-USER: CPT | Performed by: STUDENT IN AN ORGANIZED HEALTH CARE EDUCATION/TRAINING PROGRAM

## 2024-11-06 PROCEDURE — 1123F ACP DISCUSS/DSCN MKR DOCD: CPT | Performed by: STUDENT IN AN ORGANIZED HEALTH CARE EDUCATION/TRAINING PROGRAM

## 2024-11-06 PROCEDURE — G0439 PPPS, SUBSEQ VISIT: HCPCS | Performed by: STUDENT IN AN ORGANIZED HEALTH CARE EDUCATION/TRAINING PROGRAM

## 2024-11-06 PROCEDURE — 99497 ADVNCD CARE PLAN 30 MIN: CPT | Performed by: STUDENT IN AN ORGANIZED HEALTH CARE EDUCATION/TRAINING PROGRAM

## 2024-11-06 PROCEDURE — 1157F ADVNC CARE PLAN IN RCRD: CPT | Performed by: STUDENT IN AN ORGANIZED HEALTH CARE EDUCATION/TRAINING PROGRAM

## 2024-11-06 PROCEDURE — 1159F MED LIST DOCD IN RCRD: CPT | Performed by: STUDENT IN AN ORGANIZED HEALTH CARE EDUCATION/TRAINING PROGRAM

## 2024-11-06 RX ORDER — CLOPIDOGREL BISULFATE 75 MG/1
75 TABLET ORAL DAILY
Qty: 90 TABLET | Refills: 1 | Status: SHIPPED | OUTPATIENT
Start: 2024-11-06

## 2024-11-06 RX ORDER — LOSARTAN POTASSIUM 50 MG/1
50 TABLET ORAL 2 TIMES DAILY
Qty: 180 TABLET | Refills: 1 | Status: SHIPPED | OUTPATIENT
Start: 2024-11-06

## 2024-11-06 RX ORDER — TAMSULOSIN HYDROCHLORIDE 0.4 MG/1
0.4 CAPSULE ORAL DAILY
Qty: 90 CAPSULE | Refills: 1 | Status: SHIPPED | OUTPATIENT
Start: 2024-11-06

## 2024-11-06 RX ORDER — CARVEDILOL 25 MG/1
25 TABLET ORAL 2 TIMES DAILY
Qty: 180 TABLET | Refills: 1 | Status: SHIPPED | OUTPATIENT
Start: 2024-11-06

## 2024-11-06 RX ORDER — METFORMIN HYDROCHLORIDE 1000 MG/1
1000 TABLET ORAL 2 TIMES DAILY
Qty: 180 TABLET | Refills: 1 | Status: SHIPPED | OUTPATIENT
Start: 2024-11-06

## 2024-11-06 RX ORDER — LOSARTAN POTASSIUM 50 MG/1
50 TABLET ORAL DAILY
Qty: 90 TABLET | Refills: 1 | Status: SHIPPED | OUTPATIENT
Start: 2024-11-06 | End: 2024-11-06

## 2024-11-06 RX ORDER — ATORVASTATIN CALCIUM 40 MG/1
40 TABLET, FILM COATED ORAL DAILY
Qty: 90 TABLET | Refills: 1 | Status: SHIPPED | OUTPATIENT
Start: 2024-11-06

## 2024-11-06 SDOH — ECONOMIC STABILITY: FOOD INSECURITY: WITHIN THE PAST 12 MONTHS, THE FOOD YOU BOUGHT JUST DIDN'T LAST AND YOU DIDN'T HAVE MONEY TO GET MORE.: NEVER TRUE

## 2024-11-06 SDOH — ECONOMIC STABILITY: FOOD INSECURITY: WITHIN THE PAST 12 MONTHS, YOU WORRIED THAT YOUR FOOD WOULD RUN OUT BEFORE YOU GOT MONEY TO BUY MORE.: NEVER TRUE

## 2024-11-06 ASSESSMENT — ENCOUNTER SYMPTOMS
VOMITING: 0
COUGH: 0
DEPRESSION: 0
CONSTIPATION: 0
OCCASIONAL FEELINGS OF UNSTEADINESS: 0
DIZZINESS: 0
PALPITATIONS: 0
SHORTNESS OF BREATH: 0
MUSCULOSKELETAL NEGATIVE: 1
FEVER: 0
CHILLS: 0
COLOR CHANGE: 0
HEADACHES: 0
NAUSEA: 0
FATIGUE: 0
CONFUSION: 0
UNEXPECTED WEIGHT CHANGE: 0
DIARRHEA: 0
LOSS OF SENSATION IN FEET: 0
WHEEZING: 0
ABDOMINAL PAIN: 0

## 2024-11-06 ASSESSMENT — ACTIVITIES OF DAILY LIVING (ADL)
TAKING_MEDICATION: INDEPENDENT
GROCERY_SHOPPING: INDEPENDENT
DRESSING: INDEPENDENT
MANAGING_FINANCES: INDEPENDENT
BATHING: INDEPENDENT
DOING_HOUSEWORK: INDEPENDENT

## 2024-11-06 ASSESSMENT — LIFESTYLE VARIABLES
HOW MANY STANDARD DRINKS CONTAINING ALCOHOL DO YOU HAVE ON A TYPICAL DAY: 1 OR 2
HOW OFTEN DO YOU HAVE SIX OR MORE DRINKS ON ONE OCCASION: NEVER
AUDIT-C TOTAL SCORE: 4
SKIP TO QUESTIONS 9-10: 1
HOW OFTEN DO YOU HAVE A DRINK CONTAINING ALCOHOL: 4 OR MORE TIMES A WEEK

## 2024-11-06 ASSESSMENT — PATIENT HEALTH QUESTIONNAIRE - PHQ9
SUM OF ALL RESPONSES TO PHQ9 QUESTIONS 1 & 2: 0
2. FEELING DOWN, DEPRESSED OR HOPELESS: NOT AT ALL
1. LITTLE INTEREST OR PLEASURE IN DOING THINGS: NOT AT ALL

## 2024-11-06 NOTE — ASSESSMENT & PLAN NOTE
He will cont to benefit from having CPAP for ALVIN and also preventing arrhythmia and cardiovascular disease.         88.5

## 2024-11-06 NOTE — ASSESSMENT & PLAN NOTE
Remains at goal. Cont metformin as usual; may cut down dose if A1c remains stable/improved.   Orders:    metFORMIN (Glucophage) 1,000 mg tablet; Take 1 tablet (1,000 mg) by mouth 2 times a day.    Hemoglobin A1c; Future

## 2024-11-06 NOTE — ASSESSMENT & PLAN NOTE
Orders:    atorvastatin (Lipitor) 40 mg tablet; Take 1 tablet (40 mg) by mouth once daily.    Lipid Panel; Future

## 2024-11-06 NOTE — ASSESSMENT & PLAN NOTE
Orders:    tamsulosin (Flomax) 0.4 mg 24 hr capsule; Take 1 capsule (0.4 mg) by mouth once daily.

## 2024-11-06 NOTE — ASSESSMENT & PLAN NOTE
Cont taking statin & baby ASA as usual. Cont following with heart healthy diet. Follow with Dr. Guerrero as usual.   Orders:    atorvastatin (Lipitor) 40 mg tablet; Take 1 tablet (40 mg) by mouth once daily.    carvedilol (Coreg) 25 mg tablet; Take 1 tablet (25 mg) by mouth 2 times a day.

## 2024-11-06 NOTE — PROGRESS NOTES
Subjective   Patient ID: Rommel Thompson is a 73 y.o. male who presents for Medicare Annual Wellness Visit Subsequent and Follow-up (Patient already got flu and covid vaccines.  Received paperwork for cpap.).    Subjective   Reason for Visit: Rommel Thompson is an 73 y.o. male here for a Medicare Wellness visit and FU. Reports he is doing okay, no acute illness.     # HTN/HLD # CAD   - io /75 & repeat 157/79   - takes coreg 25 mg bid and losartan 50 mg daily   - takes atorva 40 mg daily & baby ASA      # Prediabetes   - last A1c 5.6 (05/24)  - takes metformin 1000 mg bid     # BPH   - reports his urinary sx are about the same; following with urologist   - takes flomax 0.4 mg daily       Reports h/o ALVIN on same CPAP for 25-30 yrs now and sleep med was at the same time. He was provided a new Rx for CPAP machine which he rec'd few wks ago and has been using daily more than 5 hrs with lot of help.     Past Medical, Surgical, and Family History reviewed and updated in chart.    Reviewed all medications by prescribing practitioner or clinical pharmacist (such as prescriptions, OTCs, herbal therapies and supplements) and documented in the medical record.    HPI  #HM stuffs  Screening tests:  - Colonoscopy (age 45-75): 12/2023; rec repeat in 10 yrs   - PSA (age 50 and older): UTD, following with urologist   - Lipid profile: UTD      Primary prevention:  - Flu shot: UTD   - RSV: UTD   - COVID vaccines: UTD  - Tdap shot: rec to get at pharmacy   - Shingles shot (age >50): UTD   - Prevnar 20: UTD   - Statin (age 40-65 or high risk): taking      Counseling:   - ETOH (age>18):  dinks wine w/ dinner   - Smoking: none      Patient Care Team:  Pilo Marquez MD as PCP - General (Family Medicine)  Pilo Marquez MD as PCP - [employer] PCP     Review of Systems   Constitutional:  Negative for chills, fatigue, fever and unexpected weight change.   HENT: Negative.     Respiratory:  Negative for cough, shortness of  "breath and wheezing.    Cardiovascular:  Negative for chest pain, palpitations and leg swelling.   Gastrointestinal:  Negative for abdominal pain, constipation, diarrhea, nausea and vomiting.   Musculoskeletal: Negative.    Skin:  Negative for color change and rash.   Neurological:  Negative for dizziness and headaches.   Psychiatric/Behavioral:  Negative for behavioral problems and confusion.        Objective   Vitals:  /79 (BP Location: Left arm, Patient Position: Sitting, BP Cuff Size: Large adult)   Pulse 52   Temp 36 °C (96.8 °F) (Temporal)   Resp 16   Ht 1.676 m (5' 6\")   Wt 101 kg (223 lb)   SpO2 95%   BMI 35.99 kg/m²       Physical Exam  Vitals and nursing note reviewed.   Constitutional:       Appearance: Normal appearance. He is obese.   HENT:      Right Ear: Tympanic membrane normal.      Left Ear: Tympanic membrane normal.   Eyes:      Extraocular Movements: Extraocular movements intact.      Pupils: Pupils are equal, round, and reactive to light.   Cardiovascular:      Rate and Rhythm: Normal rate and regular rhythm.      Pulses: Normal pulses.      Heart sounds: Normal heart sounds.   Pulmonary:      Effort: Pulmonary effort is normal.      Breath sounds: Normal breath sounds.   Abdominal:      General: Abdomen is flat. Bowel sounds are normal.      Palpations: Abdomen is soft.   Musculoskeletal:         General: Normal range of motion.   Neurological:      General: No focal deficit present.      Mental Status: He is alert.      Cranial Nerves: No cranial nerve deficit.      Sensory: No sensory deficit.      Motor: No weakness.   Psychiatric:         Mood and Affect: Mood normal.         Behavior: Behavior normal.       Assessment & Plan  S/P CABG (coronary artery bypass graft)  Cont taking statin & baby ASA as usual. Cont following with heart healthy diet. Follow with Dr. Guerrero as usual.   Orders:    atorvastatin (Lipitor) 40 mg tablet; Take 1 tablet (40 mg) by mouth once daily.    " carvedilol (Coreg) 25 mg tablet; Take 1 tablet (25 mg) by mouth 2 times a day.    Hyperlipidemia, unspecified hyperlipidemia type    Orders:    atorvastatin (Lipitor) 40 mg tablet; Take 1 tablet (40 mg) by mouth once daily.    Lipid Panel; Future    Stenosis of carotid artery, unspecified laterality  Cont DAPT as usual. Follow w/ Dr Guerrero.   Orders:    clopidogrel (Plavix) 75 mg tablet; Take 1 tablet (75 mg) by mouth once daily.    HTN (hypertension), benign  BP remains elevated; will inc losartan from 50 mg daily to bid as below. Keep BP logs. Bring in 2 wks for BP check. Bld work as below. Work on optimizing wt   Orders:    Comprehensive metabolic panel; Future    CBC and Auto Differential; Future    losartan (Cozaar) 50 mg tablet; Take 1 tablet (50 mg) by mouth 2 times a day.    Prediabetes  Remains at goal. Cont metformin as usual; may cut down dose if A1c remains stable/improved.   Orders:    metFORMIN (Glucophage) 1,000 mg tablet; Take 1 tablet (1,000 mg) by mouth 2 times a day.    Hemoglobin A1c; Future    Urinary frequency    Orders:    tamsulosin (Flomax) 0.4 mg 24 hr capsule; Take 1 capsule (0.4 mg) by mouth once daily.    Acute right flank pain    Orders:    tamsulosin (Flomax) 0.4 mg 24 hr capsule; Take 1 capsule (0.4 mg) by mouth once daily.    ALVIN on CPAP  He will cont to benefit from having CPAP for ALVIN and also preventing arrhythmia and cardiovascular disease.        Routine general medical examination at health care facility  # [facility] wellness # HM visit   - Discussed ACP with pt; will work on POA/living will paper work   - Immunization per emr   - Age appropriate screenings as listed above in [facility] summary   - refer [facility] summary above for detail  - BW ordered as listed in emr    Orders:    1 Year Follow Up In Primary Care - Wellness Exam; Future    ACP (advance care planning)           Advance Directives Discussion  16 - 20 minutes were spent discussing Advanced Care Planning (including a Living Will,  Medical Power Of , as well as specific end of life choices and/or directives). The details of that discussion were documented in Advanced Directives Discussion section of the medical record.     Rtc 2-3 mo MD KORIN Stratton, Family Medicine

## 2024-11-06 NOTE — ASSESSMENT & PLAN NOTE
Cont DAPT as usual. Follow w/ Dr Guerrero.   Orders:    clopidogrel (Plavix) 75 mg tablet; Take 1 tablet (75 mg) by mouth once daily.

## 2024-11-06 NOTE — ASSESSMENT & PLAN NOTE
BP remains elevated; will inc losartan from 50 mg daily to bid as below. Keep BP logs. Bring in 2 wks for BP check. Bld work as below. Work on optimizing wt   Orders:    Comprehensive metabolic panel; Future    CBC and Auto Differential; Future    losartan (Cozaar) 50 mg tablet; Take 1 tablet (50 mg) by mouth 2 times a day.

## 2024-11-20 ENCOUNTER — APPOINTMENT (OUTPATIENT)
Dept: PRIMARY CARE | Facility: CLINIC | Age: 74
End: 2024-11-20
Payer: MEDICARE

## 2024-11-27 ENCOUNTER — LAB (OUTPATIENT)
Dept: LAB | Facility: LAB | Age: 74
End: 2024-11-27
Payer: MEDICARE

## 2024-11-27 DIAGNOSIS — R73.03 PREDIABETES: ICD-10-CM

## 2024-11-27 DIAGNOSIS — I10 HTN (HYPERTENSION), BENIGN: Chronic | ICD-10-CM

## 2024-11-27 DIAGNOSIS — E78.5 HYPERLIPIDEMIA, UNSPECIFIED HYPERLIPIDEMIA TYPE: ICD-10-CM

## 2024-11-27 LAB
ALBUMIN SERPL BCP-MCNC: 4.5 G/DL (ref 3.4–5)
ALP SERPL-CCNC: 74 U/L (ref 33–136)
ALT SERPL W P-5'-P-CCNC: 27 U/L (ref 10–52)
ANION GAP SERPL CALC-SCNC: 12 MMOL/L (ref 10–20)
AST SERPL W P-5'-P-CCNC: 22 U/L (ref 9–39)
BASOPHILS # BLD AUTO: 0.02 X10*3/UL (ref 0–0.1)
BASOPHILS NFR BLD AUTO: 0.3 %
BILIRUB SERPL-MCNC: 0.7 MG/DL (ref 0–1.2)
BUN SERPL-MCNC: 22 MG/DL (ref 6–23)
CALCIUM SERPL-MCNC: 9.6 MG/DL (ref 8.6–10.3)
CHLORIDE SERPL-SCNC: 108 MMOL/L (ref 98–107)
CHOLEST SERPL-MCNC: 122 MG/DL (ref 0–199)
CHOLESTEROL/HDL RATIO: 2.9
CO2 SERPL-SCNC: 27 MMOL/L (ref 21–32)
CREAT SERPL-MCNC: 0.86 MG/DL (ref 0.5–1.3)
EGFRCR SERPLBLD CKD-EPI 2021: >90 ML/MIN/1.73M*2
EOSINOPHIL # BLD AUTO: 0.16 X10*3/UL (ref 0–0.4)
EOSINOPHIL NFR BLD AUTO: 2.6 %
ERYTHROCYTE [DISTWIDTH] IN BLOOD BY AUTOMATED COUNT: 15.1 % (ref 11.5–14.5)
EST. AVERAGE GLUCOSE BLD GHB EST-MCNC: 103 MG/DL
GLUCOSE SERPL-MCNC: 105 MG/DL (ref 74–99)
HBA1C MFR BLD: 5.2 %
HCT VFR BLD AUTO: 43.1 % (ref 41–52)
HDLC SERPL-MCNC: 41.8 MG/DL
HGB BLD-MCNC: 13.5 G/DL (ref 13.5–17.5)
IMM GRANULOCYTES # BLD AUTO: 0.03 X10*3/UL (ref 0–0.5)
IMM GRANULOCYTES NFR BLD AUTO: 0.5 % (ref 0–0.9)
LDLC SERPL CALC-MCNC: 63 MG/DL
LYMPHOCYTES # BLD AUTO: 1.23 X10*3/UL (ref 0.8–3)
LYMPHOCYTES NFR BLD AUTO: 19.8 %
MCH RBC QN AUTO: 29.3 PG (ref 26–34)
MCHC RBC AUTO-ENTMCNC: 31.3 G/DL (ref 32–36)
MCV RBC AUTO: 94 FL (ref 80–100)
MONOCYTES # BLD AUTO: 0.48 X10*3/UL (ref 0.05–0.8)
MONOCYTES NFR BLD AUTO: 7.7 %
NEUTROPHILS # BLD AUTO: 4.29 X10*3/UL (ref 1.6–5.5)
NEUTROPHILS NFR BLD AUTO: 69.1 %
NON HDL CHOLESTEROL: 80 MG/DL (ref 0–149)
NRBC BLD-RTO: 0 /100 WBCS (ref 0–0)
PLATELET # BLD AUTO: 160 X10*3/UL (ref 150–450)
POTASSIUM SERPL-SCNC: 4.5 MMOL/L (ref 3.5–5.3)
PROT SERPL-MCNC: 7.3 G/DL (ref 6.4–8.2)
RBC # BLD AUTO: 4.6 X10*6/UL (ref 4.5–5.9)
SODIUM SERPL-SCNC: 142 MMOL/L (ref 136–145)
TRIGL SERPL-MCNC: 85 MG/DL (ref 0–149)
VLDL: 17 MG/DL (ref 0–40)
WBC # BLD AUTO: 6.2 X10*3/UL (ref 4.4–11.3)

## 2024-11-27 PROCEDURE — 85025 COMPLETE CBC W/AUTO DIFF WBC: CPT

## 2024-11-27 PROCEDURE — 83036 HEMOGLOBIN GLYCOSYLATED A1C: CPT

## 2024-11-27 PROCEDURE — 80061 LIPID PANEL: CPT

## 2024-11-27 PROCEDURE — 36415 COLL VENOUS BLD VENIPUNCTURE: CPT

## 2024-11-27 PROCEDURE — 80053 COMPREHEN METABOLIC PANEL: CPT

## 2024-12-24 ENCOUNTER — APPOINTMENT (OUTPATIENT)
Dept: PRIMARY CARE | Facility: CLINIC | Age: 74
End: 2024-12-24
Payer: MEDICARE

## 2024-12-24 ENCOUNTER — CLINICAL SUPPORT (OUTPATIENT)
Dept: PRIMARY CARE | Facility: CLINIC | Age: 74
End: 2024-12-24
Payer: MEDICARE

## 2024-12-24 VITALS
RESPIRATION RATE: 14 BRPM | HEART RATE: 57 BPM | DIASTOLIC BLOOD PRESSURE: 64 MMHG | OXYGEN SATURATION: 97 % | SYSTOLIC BLOOD PRESSURE: 136 MMHG | TEMPERATURE: 96.4 F

## 2024-12-24 DIAGNOSIS — I10 HYPERTENSION, UNSPECIFIED TYPE: ICD-10-CM

## 2024-12-24 PROCEDURE — 99211 OFF/OP EST MAY X REQ PHY/QHP: CPT | Performed by: STUDENT IN AN ORGANIZED HEALTH CARE EDUCATION/TRAINING PROGRAM

## 2025-03-05 ENCOUNTER — APPOINTMENT (OUTPATIENT)
Dept: PRIMARY CARE | Facility: CLINIC | Age: 75
End: 2025-03-05
Payer: MEDICARE

## 2025-03-13 ENCOUNTER — APPOINTMENT (OUTPATIENT)
Dept: PRIMARY CARE | Facility: CLINIC | Age: 75
End: 2025-03-13
Payer: MEDICARE

## 2025-04-14 ENCOUNTER — APPOINTMENT (OUTPATIENT)
Dept: PRIMARY CARE | Facility: CLINIC | Age: 75
End: 2025-04-14
Payer: MEDICARE

## 2025-04-14 VITALS
HEART RATE: 68 BPM | HEIGHT: 66 IN | RESPIRATION RATE: 16 BRPM | BODY MASS INDEX: 35.84 KG/M2 | SYSTOLIC BLOOD PRESSURE: 111 MMHG | TEMPERATURE: 97.1 F | WEIGHT: 223 LBS | DIASTOLIC BLOOD PRESSURE: 68 MMHG | OXYGEN SATURATION: 96 %

## 2025-04-14 DIAGNOSIS — R10.9 ACUTE RIGHT FLANK PAIN: ICD-10-CM

## 2025-04-14 DIAGNOSIS — Z95.1 S/P CABG (CORONARY ARTERY BYPASS GRAFT): ICD-10-CM

## 2025-04-14 DIAGNOSIS — I65.29 STENOSIS OF CAROTID ARTERY, UNSPECIFIED LATERALITY: ICD-10-CM

## 2025-04-14 DIAGNOSIS — I10 HTN (HYPERTENSION), BENIGN: Primary | Chronic | ICD-10-CM

## 2025-04-14 DIAGNOSIS — E78.5 HYPERLIPIDEMIA, UNSPECIFIED HYPERLIPIDEMIA TYPE: ICD-10-CM

## 2025-04-14 DIAGNOSIS — R73.03 PREDIABETES: ICD-10-CM

## 2025-04-14 DIAGNOSIS — H93.13 TINNITUS OF BOTH EARS: ICD-10-CM

## 2025-04-14 DIAGNOSIS — R35.0 URINARY FREQUENCY: ICD-10-CM

## 2025-04-14 PROCEDURE — 1123F ACP DISCUSS/DSCN MKR DOCD: CPT | Performed by: STUDENT IN AN ORGANIZED HEALTH CARE EDUCATION/TRAINING PROGRAM

## 2025-04-14 PROCEDURE — 1157F ADVNC CARE PLAN IN RCRD: CPT | Performed by: STUDENT IN AN ORGANIZED HEALTH CARE EDUCATION/TRAINING PROGRAM

## 2025-04-14 PROCEDURE — 1036F TOBACCO NON-USER: CPT | Performed by: STUDENT IN AN ORGANIZED HEALTH CARE EDUCATION/TRAINING PROGRAM

## 2025-04-14 PROCEDURE — G2211 COMPLEX E/M VISIT ADD ON: HCPCS | Performed by: STUDENT IN AN ORGANIZED HEALTH CARE EDUCATION/TRAINING PROGRAM

## 2025-04-14 PROCEDURE — 99214 OFFICE O/P EST MOD 30 MIN: CPT | Performed by: STUDENT IN AN ORGANIZED HEALTH CARE EDUCATION/TRAINING PROGRAM

## 2025-04-14 PROCEDURE — 1160F RVW MEDS BY RX/DR IN RCRD: CPT | Performed by: STUDENT IN AN ORGANIZED HEALTH CARE EDUCATION/TRAINING PROGRAM

## 2025-04-14 PROCEDURE — 3074F SYST BP LT 130 MM HG: CPT | Performed by: STUDENT IN AN ORGANIZED HEALTH CARE EDUCATION/TRAINING PROGRAM

## 2025-04-14 PROCEDURE — 3008F BODY MASS INDEX DOCD: CPT | Performed by: STUDENT IN AN ORGANIZED HEALTH CARE EDUCATION/TRAINING PROGRAM

## 2025-04-14 PROCEDURE — 1159F MED LIST DOCD IN RCRD: CPT | Performed by: STUDENT IN AN ORGANIZED HEALTH CARE EDUCATION/TRAINING PROGRAM

## 2025-04-14 PROCEDURE — 3078F DIAST BP <80 MM HG: CPT | Performed by: STUDENT IN AN ORGANIZED HEALTH CARE EDUCATION/TRAINING PROGRAM

## 2025-04-14 RX ORDER — ATORVASTATIN CALCIUM 40 MG/1
40 TABLET, FILM COATED ORAL DAILY
Qty: 90 TABLET | Refills: 1 | Status: SHIPPED | OUTPATIENT
Start: 2025-04-14

## 2025-04-14 RX ORDER — CLOPIDOGREL BISULFATE 75 MG/1
75 TABLET ORAL DAILY
Qty: 90 TABLET | Refills: 1 | Status: SHIPPED | OUTPATIENT
Start: 2025-04-14

## 2025-04-14 RX ORDER — TAMSULOSIN HYDROCHLORIDE 0.4 MG/1
0.4 CAPSULE ORAL DAILY
Qty: 90 CAPSULE | Refills: 1 | Status: SHIPPED | OUTPATIENT
Start: 2025-04-14

## 2025-04-14 RX ORDER — CARVEDILOL 25 MG/1
25 TABLET ORAL 2 TIMES DAILY
Qty: 180 TABLET | Refills: 1 | Status: SHIPPED | OUTPATIENT
Start: 2025-04-14

## 2025-04-14 RX ORDER — LOSARTAN POTASSIUM 50 MG/1
50 TABLET ORAL 2 TIMES DAILY
Qty: 180 TABLET | Refills: 1 | Status: SHIPPED | OUTPATIENT
Start: 2025-04-14

## 2025-04-14 RX ORDER — METFORMIN HYDROCHLORIDE 500 MG/1
1000 TABLET, EXTENDED RELEASE ORAL
Qty: 60 TABLET | Refills: 5 | Status: SHIPPED | OUTPATIENT
Start: 2025-04-14

## 2025-04-14 RX ORDER — METFORMIN HYDROCHLORIDE 1000 MG/1
1000 TABLET ORAL 2 TIMES DAILY
Qty: 180 TABLET | Refills: 1 | Status: CANCELLED | OUTPATIENT
Start: 2025-04-14

## 2025-04-14 SDOH — ECONOMIC STABILITY: FOOD INSECURITY: WITHIN THE PAST 12 MONTHS, YOU WORRIED THAT YOUR FOOD WOULD RUN OUT BEFORE YOU GOT MONEY TO BUY MORE.: NEVER TRUE

## 2025-04-14 SDOH — ECONOMIC STABILITY: FOOD INSECURITY: WITHIN THE PAST 12 MONTHS, THE FOOD YOU BOUGHT JUST DIDN'T LAST AND YOU DIDN'T HAVE MONEY TO GET MORE.: NEVER TRUE

## 2025-04-14 ASSESSMENT — LIFESTYLE VARIABLES
SKIP TO QUESTIONS 9-10: 1
AUDIT-C TOTAL SCORE: 4
HOW MANY STANDARD DRINKS CONTAINING ALCOHOL DO YOU HAVE ON A TYPICAL DAY: 1 OR 2
HOW OFTEN DO YOU HAVE A DRINK CONTAINING ALCOHOL: 4 OR MORE TIMES A WEEK
HOW OFTEN DO YOU HAVE SIX OR MORE DRINKS ON ONE OCCASION: NEVER

## 2025-04-14 ASSESSMENT — ENCOUNTER SYMPTOMS
CONFUSION: 0
MUSCULOSKELETAL NEGATIVE: 1
DIARRHEA: 0
COLOR CHANGE: 0
FATIGUE: 0
VOMITING: 0
CHILLS: 0
FEVER: 0
SHORTNESS OF BREATH: 0
CONSTIPATION: 0
WHEEZING: 0
HEADACHES: 0
COUGH: 0
DIZZINESS: 0
PALPITATIONS: 0
ABDOMINAL PAIN: 0
UNEXPECTED WEIGHT CHANGE: 0
NAUSEA: 0

## 2025-04-14 ASSESSMENT — PATIENT HEALTH QUESTIONNAIRE - PHQ9
1. LITTLE INTEREST OR PLEASURE IN DOING THINGS: SEVERAL DAYS
SUM OF ALL RESPONSES TO PHQ9 QUESTIONS 1 & 2: 2
2. FEELING DOWN, DEPRESSED OR HOPELESS: SEVERAL DAYS

## 2025-04-14 NOTE — PROGRESS NOTES
"Subjective   Patient ID: Rommel Thompson is a 74 y.o. male who presents for Follow-up (6 month follow up).    HPI   He is here for follow up. Reports he is doing well, no acute illness today other than ringing in bilateral ears.  Reports hearing is okay, denies ear ache and drainage from ear.  Reports chronic problems are stable as listed below.    # HTN/HLD # CAD   - io /68  - takes coreg 25 mg bid and losartan 50 mg bid   - takes atorva 40 mg daily & baby ASA   - he is following with cards/annually      # Prediabetes   - last A1c 5.2 (11/24), at goal  - takes metformin 1000 mg bid, reports occa diarrhea usually in the morning     # BPH   - reports his urinary sx are stable; following with urology and was rec to see Dr. Jimenez & repeat PA in 6 mo however was not seen yet  - takes flomax 0.4 mg daily       Review of Systems   Constitutional:  Negative for chills, fatigue, fever and unexpected weight change.   HENT: Negative.     Respiratory:  Negative for cough, shortness of breath and wheezing.    Cardiovascular:  Negative for chest pain, palpitations and leg swelling.   Gastrointestinal:  Negative for abdominal pain, constipation, diarrhea, nausea and vomiting.   Musculoskeletal: Negative.    Skin:  Negative for color change and rash.   Neurological:  Negative for dizziness and headaches.   Psychiatric/Behavioral:  Negative for behavioral problems and confusion.        Objective   /68 (BP Location: Right arm, Patient Position: Sitting, BP Cuff Size: Large adult)   Pulse 68   Temp 36.2 °C (97.1 °F) (Temporal)   Resp 16   Ht 1.676 m (5' 6\")   Wt 101 kg (223 lb)   SpO2 96%   BMI 35.99 kg/m²     Physical Exam  Vitals and nursing note reviewed.   Constitutional:       Appearance: Normal appearance. He is obese.   Cardiovascular:      Rate and Rhythm: Normal rate and regular rhythm.      Pulses: Normal pulses.      Heart sounds: Normal heart sounds.   Pulmonary:      Effort: Pulmonary effort is normal. " No respiratory distress.      Breath sounds: Normal breath sounds.   Abdominal:      General: Abdomen is flat. Bowel sounds are normal.      Palpations: Abdomen is soft.   Musculoskeletal:         General: Normal range of motion.   Neurological:      General: No focal deficit present.      Mental Status: He is alert and oriented to person, place, and time.   Psychiatric:         Mood and Affect: Mood normal.         Behavior: Behavior normal.         Assessment/Plan   He is here for follow-up visit.  He is doing well, no acute illness except ongoing tinnitus.  We will put a referral to see audiology for evaluation.  Plan as follows    # HTN/HLD # CAD   - BP well controlled   - cont coreg 25 mg bid and losartan 50 mg daily   - cont atorva 40 mg daily & baby ASA   - encourage heart healthy & DASH diet; continue exercise as tolerated, at least 150 mins per wk   - BW per emr, will do prior to next visit; pt made aware  - Continue follow-up with cardiology as scheduled/recommended     # Prediabetes   - last A1c 5.2 (11/24), at goal  - Will decrease metformin from 1000 twice daily to 1000 ER tablet once daily due to occasional diarrhea  - Continue following low glycemic/diabetic diet  - bld work as below      # BPH   - urinary sx overall stable    - cont flomax 0.4 mg daily   - Highly encourage to make follow-up appointment with urology as recommended;  will try to help to schedule appointment    Problem List Items Addressed This Visit             ICD-10-CM    Carotid artery stenosis I65.29    Relevant Medications    clopidogrel (Plavix) 75 mg tablet    Prediabetes R73.03    Relevant Medications    metFORMIN XR (Glucophage-XR) 500 mg 24 hr tablet    Other Relevant Orders    Hemoglobin A1c    S/P CABG (coronary artery bypass graft) Z95.1    Relevant Medications    atorvastatin (Lipitor) 40 mg tablet    carvedilol (Coreg) 25 mg tablet    HTN (hypertension), benign - Primary (Chronic) I10    Relevant Medications     losartan (Cozaar) 50 mg tablet    Other Relevant Orders    Comprehensive Metabolic Panel    CBC    Hyperlipidemia (Chronic) E78.5    Relevant Medications    atorvastatin (Lipitor) 40 mg tablet    Other Relevant Orders    Lipid Panel    Urinary frequency R35.0    Relevant Medications    tamsulosin (Flomax) 0.4 mg 24 hr capsule    Acute right flank pain R10.9    Relevant Medications    tamsulosin (Flomax) 0.4 mg 24 hr capsule     Other Visit Diagnoses         Codes    Tinnitus of both ears     H93.13    Relevant Orders    Referral to Audiology          RTC 6 months for MCR/follow-up    This note was partially generated using the Dragon Voice recognition system. There may be some incorrect wording, spelling and/or spelling errors or punctuation errors that were not corrected prior to committing the note to the medical record.      Pilo Marquez MD    [facility], Family Medicine

## 2025-04-18 LAB
ALBUMIN SERPL-MCNC: 4.5 G/DL (ref 3.6–5.1)
ALP SERPL-CCNC: 62 U/L (ref 35–144)
ALT SERPL-CCNC: 29 U/L (ref 9–46)
ANION GAP SERPL CALCULATED.4IONS-SCNC: 9 MMOL/L (CALC) (ref 7–17)
AST SERPL-CCNC: 22 U/L (ref 10–35)
BILIRUB SERPL-MCNC: 0.6 MG/DL (ref 0.2–1.2)
BUN SERPL-MCNC: 24 MG/DL (ref 7–25)
CALCIUM SERPL-MCNC: 9.6 MG/DL (ref 8.6–10.3)
CHLORIDE SERPL-SCNC: 106 MMOL/L (ref 98–110)
CHOLEST SERPL-MCNC: 127 MG/DL
CHOLEST/HDLC SERPL: 2.8 (CALC)
CO2 SERPL-SCNC: 25 MMOL/L (ref 20–32)
CREAT SERPL-MCNC: 1.09 MG/DL (ref 0.7–1.28)
EGFRCR SERPLBLD CKD-EPI 2021: 71 ML/MIN/1.73M2
ERYTHROCYTE [DISTWIDTH] IN BLOOD BY AUTOMATED COUNT: 13.9 % (ref 11–15)
EST. AVERAGE GLUCOSE BLD GHB EST-MCNC: 111 MG/DL
EST. AVERAGE GLUCOSE BLD GHB EST-SCNC: 6.2 MMOL/L
GLUCOSE SERPL-MCNC: 97 MG/DL (ref 65–99)
HBA1C MFR BLD: 5.5 %
HCT VFR BLD AUTO: 40.3 % (ref 38.5–50)
HDLC SERPL-MCNC: 45 MG/DL
HGB BLD-MCNC: 13.4 G/DL (ref 13.2–17.1)
LDLC SERPL CALC-MCNC: 63 MG/DL (CALC)
MCH RBC QN AUTO: 31.6 PG (ref 27–33)
MCHC RBC AUTO-ENTMCNC: 33.3 G/DL (ref 32–36)
MCV RBC AUTO: 95 FL (ref 80–100)
NONHDLC SERPL-MCNC: 82 MG/DL (CALC)
PLATELET # BLD AUTO: 160 THOUSAND/UL (ref 140–400)
PMV BLD REES-ECKER: 10.5 FL (ref 7.5–12.5)
POTASSIUM SERPL-SCNC: 4.3 MMOL/L (ref 3.5–5.3)
PROT SERPL-MCNC: 7.3 G/DL (ref 6.1–8.1)
RBC # BLD AUTO: 4.24 MILLION/UL (ref 4.2–5.8)
SODIUM SERPL-SCNC: 140 MMOL/L (ref 135–146)
TRIGL SERPL-MCNC: 107 MG/DL
WBC # BLD AUTO: 7.4 THOUSAND/UL (ref 3.8–10.8)

## 2025-04-22 ENCOUNTER — TELEPHONE (OUTPATIENT)
Dept: PRIMARY CARE | Facility: CLINIC | Age: 75
End: 2025-04-22
Payer: MEDICARE

## 2025-04-22 NOTE — TELEPHONE ENCOUNTER
----- Message from Pilo Marquez sent at 4/21/2025 11:24 PM EDT -----  Normal lab work result, will review at NOV. Cont current meds. Follow up as scheduled. -Dr. Marquez  ----- Message -----  From: [email or web address] Results In  Sent: 4/17/2025   9:51 PM EDT  To: Pilo Marquez MD

## 2025-05-19 ENCOUNTER — CLINICAL SUPPORT (OUTPATIENT)
Dept: AUDIOLOGY | Facility: CLINIC | Age: 75
End: 2025-05-19
Payer: MEDICARE

## 2025-05-19 DIAGNOSIS — H93.13 SUBJECTIVE TINNITUS OF BOTH EARS: Primary | ICD-10-CM

## 2025-05-19 DIAGNOSIS — H91.93 BILATERAL HEARING LOSS, UNSPECIFIED HEARING LOSS TYPE: ICD-10-CM

## 2025-05-19 PROCEDURE — 92567 TYMPANOMETRY: CPT

## 2025-05-19 PROCEDURE — 92557 COMPREHENSIVE HEARING TEST: CPT

## 2025-05-19 ASSESSMENT — PAIN SCALES - GENERAL: PAINLEVEL_OUTOF10: 0 - NO PAIN

## 2025-05-19 ASSESSMENT — PAIN - FUNCTIONAL ASSESSMENT: PAIN_FUNCTIONAL_ASSESSMENT: 0-10

## 2025-05-19 NOTE — PROGRESS NOTES
AUDIOLOGY ADULT AUDIOMETRIC EVALUATION     Name: Rommel Thompson   : 1950 Age: 74 y.o.   Date of Evaluation: 2025 Time: 2484-8057     IMPRESSIONS   Hearing sensitivity within normal limits sloping to mild unspecified likely sensorineural hearing loss in both ears. No previous results available.  Word recognition in quiet and in noise is excellent in both ears.  Tympanometry indicates negative middle ear pressure and normal eardrum mobility in both ears.   Amplification needs: Amplification is not warranted at this time. Consider amplification if hearing loss begins affecting communication.    Today's test results are hearing loss requiring medical/otologic and audiologic follow-up.     RECOMMENDATIONS   Continue medical follow up with primary care provider and/or Ears Nose and Throat (ENT) provider as recommended.  Return for audiologic evaluation in conjunction with medical management or annually (whichever is sooner) to monitor hearing sensitivity and assess middle ear status or sooner should concerns arise. The audiology department can be reached at [phone] to schedule an appointment.   Avoid exposure to loud sounds by moving away from the noise, turning down the volume, or wearing proper hearing protection correctly.  Consider use of good communication strategies. These include but are not limited to the following: get Rommel's attention before speaking to him, close the distance between Rommel and who is speaking, limit background noise, allow Rommel access to visual cues (i.e. facial expressions/mouth movements, pictures, written instructions, etc.). When in situations where background noise cannot be avoided, position yourself so that the background noise is to your back, and you communication partner is seated in front of you, ideally with a quiet area or wall behind them.   Consider use of tinnitus management options, which include but are not limited to the following: sound therapy (use of  pleasant or calming sounds that diminish the presence of tinnitus); mindfulness, meditation, and breathing exercises; sleep hygiene; mental health evaluation and formal therapies; psychiatric management of psychopharmaceuticals; dental/orthodontia evaluation; dietary changes (reduction of sodium, caffeine, alcohol); lifestyle changes (exercise, etc.); use of hearing protection and avoidance of loud noise; and formal tinnitus therapies (Tinnitus Retraining Therapy/ TRT, Progressive Tinnitus Management, Tinnitus Activities Treatment, Cognitive Behavioral Therapy).    HISTORY    History obtained from patient report and chart review; please see medical records for complete history. Mr. Thompson was seen today for an initial audiologic evaluation at the request of Pilo Marquez MD.  Reason for visit: Tinnitus in both ears. Onset about a month ago. Feels tinnitus started slightly before Covid infection.   Change in Hearing: denied. Reports his wife says he doesn't hear when she says.  Tinnitus: yes in both ears; constant, non-bothersome, non-pulsatile, and described as ringing sensation. Described as high-pitched, low volume. Does find it bothersome at times. Does not interfere with sleeping or concentrating.   Otalgia: denied  Aural Pressure/Fullness: denied  Recent Ear Infections/Otorrhea: denied. Does endorse Covid-19 infection about a month ago.   Dizziness: denied  History of Ear Surgeries: denied  History of Noise Exposure: denied  Hearing Aid Use: None  Family History of Hearing Loss: Yes, maternal mother  Falls within the last year: denied  Other Significant History: denied    EVALUATION AND PATIENT EDUCATION   The following is a brief interpretation of the obtained findings from the audiologic evaluation. Discussed results and recommendations with Mr. Thompson. Questions were addressed and the patient was encouraged to contact our department at [phone] should concerns arise.     TEST RESULTS - See scanned  "audiogram in \"Media.\"   Otoscopic Evaluation:   Right Ear: Ear canal clear, tympanic membrane visualized.   Left Ear: Non-occluding cerumen, tympanic membrane visualized.       Tympanometry (226 Hz): An objective evaluation of middle ear function. CPT code: 02753   Right Ear: Type C, negative middle ear pressure (-174 daPa) and normal eardrum mobility.   Left Ear: Type C, negative middle ear pressure (-161 daPa) and normal eardrum mobility.       Acoustic Reflexes: An objective measure of auditory and facial nerve pathways.   (Probe) Right Ear (ipsi right stimulus ear; contralateral left stimulus ear):   Did not test.   (Probe) Left Ear (ipsi left stimulus ear; contralateral right stimulus ear):   Did not test.       Distortion Product Otoacoustic Emissions (DPOAE): An objective measurement of responses generated by the cochlea when simultaneously stimulated by two pure tone frequencies. CPT code: 09295   Right Ear: Did not test.   Left Ear: Did not test.   Present OAEs suggest normal or near cochlear outer hair cell function for corresponding frequency region(s). Absent OAEs with normal middle ear function can be consistent with some degree of hearing loss. Assessment of cochlear outer hair cell function may be impacted by outer or middle ear function.       Test technique: Conventional Audiometry via headphones.   An evaluation of hearing sensitivity via air and bone conduction and speech recognition. CPT code: 11631   Reliability: good       Pure Tone Audiometry:    Right Ear: Hearing sensitivity within normal limits for 125-6000 Hz, sloping to mild unspecified likely sensorineural hearing loss at 8000 Hz.   Left Ear: Hearing sensitivity within normal limits for 125-6000 Hz, sloping to mild unspecified likely sensorineural hearing loss at 8000 Hz.       Speech Audiometry:    Right Ear: Speech Reception Threshold (SRT) was obtained at 10 dB HL. This is in good agreement with three frequency Pure Tone Average " (PTA).  Word Recognition scores in quiet were excellent (90%) when words were presented at 50 dB HL. These results are based on [facility] Auditory Test No.6 (NU-6) Ordered by difficulty (N=10).   Left Ear: Speech Reception Threshold (SRT) was obtained at 10 dB HL. This is in good agreement with three frequency Pure Tone Average (PTA).  Word Recognition scores in quiet were excellent (100%) when words were presented at 50 dB HL. These results are based on [facility] Auditory Test No.6 (NU-6) Ordered by difficulty (N=10).   Speech in Noise (SIN):    Quick-SIN was presented at 70 dB HL and indicated the following:   Right Ear: 2.5,  normal/near normal (0-3 dB)   Left Ear: 3.5,  normal/near normal (0-3 dB)   Binaural: 0.5,  normal/near normal (0-3 dB)   SNR Loss Degree of SNR Loss Expected Improvement with Directional Microphone   0-3 dB Normal/Near normal May hear better than those with normal hearing are able to in noise.   3-7 dB Mild SNR loss  May hear almost as well as those with normal hearing are able to hear in noise.   7-15 dB Moderate SNR loss Directional microphones help; consider array microphone.   >15 dB Severe SNR Maximum SNR improvement is needed; consider an FM system.             Jessica Mayer, LANCE, CCC-A  Licensed Clinical Audiologist    Degree of Hearing Decibel Range  Key   Within Normal Limits 0-20  CNT Could Not Test   Slight 21-25  DNT Did Not Test   Mild 26-40  ECV Ear Canal Volume   Moderate 41-55  OAE Otoacoustic Emissions   Moderately-Severe 56-70  SIN Speech in Noise   Severe 71-90  TM Tympanic Membrane   Profound 91+  HA Hearing Aid   Sensorineural Hearing Loss SNHL  MLV Monitored Live Voice   Conductive Hearing Loss CHL  WNL Within Normal Limits   Noise-Induced Hearing Loss NIHL

## 2025-06-05 ENCOUNTER — HOSPITAL ENCOUNTER (EMERGENCY)
Facility: HOSPITAL | Age: 75
Discharge: HOME | End: 2025-06-05
Attending: EMERGENCY MEDICINE
Payer: MEDICARE

## 2025-06-05 ENCOUNTER — OFFICE VISIT (OUTPATIENT)
Dept: URGENT CARE | Age: 75
End: 2025-06-05
Payer: MEDICARE

## 2025-06-05 ENCOUNTER — APPOINTMENT (OUTPATIENT)
Dept: RADIOLOGY | Facility: HOSPITAL | Age: 75
End: 2025-06-05
Payer: MEDICARE

## 2025-06-05 VITALS
OXYGEN SATURATION: 97 % | SYSTOLIC BLOOD PRESSURE: 106 MMHG | TEMPERATURE: 97.6 F | DIASTOLIC BLOOD PRESSURE: 43 MMHG | RESPIRATION RATE: 17 BRPM | HEART RATE: 55 BPM

## 2025-06-05 VITALS
WEIGHT: 223 LBS | HEART RATE: 56 BPM | TEMPERATURE: 97.7 F | DIASTOLIC BLOOD PRESSURE: 88 MMHG | BODY MASS INDEX: 35.84 KG/M2 | SYSTOLIC BLOOD PRESSURE: 168 MMHG | RESPIRATION RATE: 14 BRPM | HEIGHT: 66 IN | OXYGEN SATURATION: 98 %

## 2025-06-05 DIAGNOSIS — H53.50 VISUAL COLOR CHANGES: Primary | ICD-10-CM

## 2025-06-05 DIAGNOSIS — H53.8 BLURRY VISION, LEFT EYE: ICD-10-CM

## 2025-06-05 DIAGNOSIS — R26.2 DIFFICULTY WALKING: ICD-10-CM

## 2025-06-05 DIAGNOSIS — H53.9 VISUAL DISTURBANCE: ICD-10-CM

## 2025-06-05 DIAGNOSIS — H57.9 EYE PRESSURE: Primary | ICD-10-CM

## 2025-06-05 LAB
ALBUMIN SERPL BCP-MCNC: 4.4 G/DL (ref 3.4–5)
ALP SERPL-CCNC: 63 U/L (ref 33–136)
ALT SERPL W P-5'-P-CCNC: 23 U/L (ref 10–52)
ANION GAP SERPL CALC-SCNC: 11 MMOL/L (ref 10–20)
AST SERPL W P-5'-P-CCNC: 20 U/L (ref 9–39)
BASOPHILS # BLD AUTO: 0.03 X10*3/UL (ref 0–0.1)
BASOPHILS NFR BLD AUTO: 0.5 %
BILIRUB SERPL-MCNC: 1 MG/DL (ref 0–1.2)
BUN SERPL-MCNC: 25 MG/DL (ref 6–23)
CALCIUM SERPL-MCNC: 9.9 MG/DL (ref 8.6–10.3)
CHLORIDE SERPL-SCNC: 105 MMOL/L (ref 98–107)
CO2 SERPL-SCNC: 27 MMOL/L (ref 21–32)
CREAT SERPL-MCNC: 1.11 MG/DL (ref 0.5–1.3)
CRP SERPL-MCNC: 0.67 MG/DL
EGFRCR SERPLBLD CKD-EPI 2021: 70 ML/MIN/1.73M*2
EOSINOPHIL # BLD AUTO: 0.13 X10*3/UL (ref 0–0.4)
EOSINOPHIL NFR BLD AUTO: 2.1 %
ERYTHROCYTE [DISTWIDTH] IN BLOOD BY AUTOMATED COUNT: 13.5 % (ref 11.5–14.5)
ERYTHROCYTE [SEDIMENTATION RATE] IN BLOOD BY WESTERGREN METHOD: 6 MM/H (ref 0–20)
GLUCOSE SERPL-MCNC: 102 MG/DL (ref 74–99)
HCT VFR BLD AUTO: 40.6 % (ref 41–52)
HGB BLD-MCNC: 13.2 G/DL (ref 13.5–17.5)
IMM GRANULOCYTES # BLD AUTO: 0.03 X10*3/UL (ref 0–0.5)
IMM GRANULOCYTES NFR BLD AUTO: 0.5 % (ref 0–0.9)
LYMPHOCYTES # BLD AUTO: 1.22 X10*3/UL (ref 0.8–3)
LYMPHOCYTES NFR BLD AUTO: 19.4 %
MCH RBC QN AUTO: 30.2 PG (ref 26–34)
MCHC RBC AUTO-ENTMCNC: 32.5 G/DL (ref 32–36)
MCV RBC AUTO: 93 FL (ref 80–100)
MONOCYTES # BLD AUTO: 0.6 X10*3/UL (ref 0.05–0.8)
MONOCYTES NFR BLD AUTO: 9.5 %
NEUTROPHILS # BLD AUTO: 4.29 X10*3/UL (ref 1.6–5.5)
NEUTROPHILS NFR BLD AUTO: 68 %
NRBC BLD-RTO: 0 /100 WBCS (ref 0–0)
PLATELET # BLD AUTO: 159 X10*3/UL (ref 150–450)
POC FINGERSTICK BLOOD GLUCOSE: 111 MG/DL (ref 70–100)
POTASSIUM SERPL-SCNC: 4.1 MMOL/L (ref 3.5–5.3)
PROT SERPL-MCNC: 7.3 G/DL (ref 6.4–8.2)
RBC # BLD AUTO: 4.37 X10*6/UL (ref 4.5–5.9)
SODIUM SERPL-SCNC: 139 MMOL/L (ref 136–145)
WBC # BLD AUTO: 6.3 X10*3/UL (ref 4.4–11.3)

## 2025-06-05 PROCEDURE — 36415 COLL VENOUS BLD VENIPUNCTURE: CPT | Performed by: NURSE PRACTITIONER

## 2025-06-05 PROCEDURE — 80053 COMPREHEN METABOLIC PANEL: CPT | Performed by: NURSE PRACTITIONER

## 2025-06-05 PROCEDURE — 85025 COMPLETE CBC W/AUTO DIFF WBC: CPT | Performed by: NURSE PRACTITIONER

## 2025-06-05 PROCEDURE — 70496 CT ANGIOGRAPHY HEAD: CPT | Performed by: STUDENT IN AN ORGANIZED HEALTH CARE EDUCATION/TRAINING PROGRAM

## 2025-06-05 PROCEDURE — 70496 CT ANGIOGRAPHY HEAD: CPT

## 2025-06-05 PROCEDURE — 70498 CT ANGIOGRAPHY NECK: CPT

## 2025-06-05 PROCEDURE — 85652 RBC SED RATE AUTOMATED: CPT | Performed by: NURSE PRACTITIONER

## 2025-06-05 PROCEDURE — 86140 C-REACTIVE PROTEIN: CPT | Performed by: NURSE PRACTITIONER

## 2025-06-05 PROCEDURE — 99285 EMERGENCY DEPT VISIT HI MDM: CPT | Performed by: EMERGENCY MEDICINE

## 2025-06-05 PROCEDURE — 70498 CT ANGIOGRAPHY NECK: CPT | Performed by: STUDENT IN AN ORGANIZED HEALTH CARE EDUCATION/TRAINING PROGRAM

## 2025-06-05 PROCEDURE — 2550000001 HC RX 255 CONTRASTS: Performed by: NURSE PRACTITIONER

## 2025-06-05 PROCEDURE — 70481 CT ORBIT/EAR/FOSSA W/DYE: CPT

## 2025-06-05 RX ORDER — CYANOCOBALAMIN (VITAMIN B-12) 2500 MCG
1 TABLET, SUBLINGUAL SUBLINGUAL DAILY
COMMUNITY

## 2025-06-05 RX ADMIN — IOHEXOL 75 ML: 350 INJECTION, SOLUTION INTRAVENOUS at 15:38

## 2025-06-05 ASSESSMENT — ENCOUNTER SYMPTOMS
ACTIVITY CHANGE: 1
LIGHT-HEADEDNESS: 1
DIZZINESS: 1
CARDIOVASCULAR NEGATIVE: 1
NAUSEA: 1
RESPIRATORY NEGATIVE: 1
BLURRED VISION: 1
HEADACHES: 1
WEAKNESS: 1

## 2025-06-05 ASSESSMENT — COLUMBIA-SUICIDE SEVERITY RATING SCALE - C-SSRS
6. HAVE YOU EVER DONE ANYTHING, STARTED TO DO ANYTHING, OR PREPARED TO DO ANYTHING TO END YOUR LIFE?: NO
2. HAVE YOU ACTUALLY HAD ANY THOUGHTS OF KILLING YOURSELF?: NO
1. IN THE PAST MONTH, HAVE YOU WISHED YOU WERE DEAD OR WISHED YOU COULD GO TO SLEEP AND NOT WAKE UP?: NO

## 2025-06-05 ASSESSMENT — PAIN SCALES - GENERAL
PAINLEVEL_OUTOF10: 0 - NO PAIN
PAINLEVEL_OUTOF10: 0 - NO PAIN

## 2025-06-05 ASSESSMENT — LIFESTYLE VARIABLES
HAVE PEOPLE ANNOYED YOU BY CRITICIZING YOUR DRINKING: NO
EVER HAD A DRINK FIRST THING IN THE MORNING TO STEADY YOUR NERVES TO GET RID OF A HANGOVER: NO
TOTAL SCORE: 0
HAVE YOU EVER FELT YOU SHOULD CUT DOWN ON YOUR DRINKING: NO
EVER FELT BAD OR GUILTY ABOUT YOUR DRINKING: NO

## 2025-06-05 ASSESSMENT — PAIN - FUNCTIONAL ASSESSMENT: PAIN_FUNCTIONAL_ASSESSMENT: 0-10

## 2025-06-05 NOTE — ED TRIAGE NOTES
Pt to ED via POV c/o change of vision in L eye, approx 1240, states yellow lines while driving had green breaks in them. States got dizzy after getting out of car. Pt went to  and was sent here. Pt with TIA in 2017. States vision has returned to normal at this time. A&Ox4 GCS 15. NIHSS 0 during triage

## 2025-06-05 NOTE — PROGRESS NOTES
Rommel Thompson is a 74 y.o. male admitted for No Principal Problem: There is no principal problem currently on the Problem List. Please update the Problem List and refresh.. Pharmacy reviewed the patient's [id number] medications and allergies for accuracy.    The list below reflects the PTA list prior to pharmacy medication history. A summary a changes to the PTA medication list has been listed below. Please review each medication in order reconciliation for additional clarification and justification.    Source of information: t2p     Medications added:  Biotin 5,000mcg- 1 every day     Medications modified:  Vitamin b-12 --> 1 every day     Medications to be removed:  Multivitamin     Medications of concern:      Prior to Admission Medications   Prescriptions Last Dose Informant Patient Reported? Taking?   aspirin 81 mg EC tablet   Yes No   Sig: Take 1 tablet (81 mg) by mouth once daily.   atorvastatin (Lipitor) 40 mg tablet   No No   Sig: Take 1 tablet (40 mg) by mouth once daily.   carvedilol (Coreg) 25 mg tablet   No No   Sig: Take 1 tablet (25 mg) by mouth 2 times a day.   cholecalciferol (Vitamin D-3) 5,000 Units tablet   Yes No   Sig: Take 1 tablet (5,000 Units) by mouth.   clopidogrel (Plavix) 75 mg tablet   No No   Sig: Take 1 tablet (75 mg) by mouth once daily.   cyanocobalamin, vitamin B-12, (Vitamin B-12) 1,000 mcg tablet extended release   Yes No   losartan (Cozaar) 50 mg tablet   No No   Sig: Take 1 tablet (50 mg) by mouth 2 times a day.   metFORMIN XR (Glucophage-XR) 500 mg 24 hr tablet   No No   Sig: Take 2 tablets (1,000 mg) by mouth once daily in the evening. Take with meals. Do not crush, chew, or split.   multivitamin with minerals (multivit-min-iron fum-folic ac) tablet   Yes No   Sig: Take 1 tablet by mouth once daily.   tadalafil (Cialis) 5 mg tablet   No No   Sig: Take 1 tablet (5 mg) by mouth once daily.   tamsulosin (Flomax) 0.4 mg 24 hr capsule   No No   Sig: Take 1 capsule (0.4 mg)  by mouth once daily.      Facility-Administered Medications: None       ANA LILIA CAROLINA

## 2025-06-05 NOTE — ED PROVIDER NOTES
Chief Complaint   Patient presents with   • Eye Problem   • Dizziness       HPI       74 year old male presents to the Emergency Department today complaining of a 20-30 minute episode of a vision change in the left eye. Reports that the episode occurred while he was driving. Notes that the white road lines were purple and yellow lines were green. States that his vision was slightly blurry as well. Had dizziness associated with the above. Upon presentation to the ED, he reports that all of the symptoms have since resolved. Denies any associated fever, chills, headache, neck pain, chest pain, shortness of breath, abdominal pain, nausea, vomiting, diarrhea, constipation, or urinary symptoms. Urgent care noted that he felt unsteady with walking, but he denies such upon our exam.       History provided by:  Patient             Patient History   Medical History[1]  Surgical History[2]  Family History[3]  Social History[4]        Physical Exam  Constitutional:       Appearance: Normal appearance.   HENT:      Head: Normocephalic.      Right Ear: Tympanic membrane, ear canal and external ear normal.      Left Ear: Tympanic membrane, ear canal and external ear normal.      Nose: Nose normal.      Mouth/Throat:      Mouth: Mucous membranes are moist.      Pharynx: Oropharynx is clear. No oropharyngeal exudate or posterior oropharyngeal erythema.   Eyes:      Conjunctiva/sclera: Conjunctivae normal.      Pupils: Pupils are equal, round, and reactive to light.   Cardiovascular:      Rate and Rhythm: Normal rate and regular rhythm.      Pulses:           Radial pulses are 3+ on the right side and 3+ on the left side.        Dorsalis pedis pulses are 3+ on the right side and 3+ on the left side.      Heart sounds: Normal heart sounds. No murmur heard.     No friction rub. No gallop.   Pulmonary:      Effort: Pulmonary effort is normal. No respiratory distress.      Breath sounds: Normal breath sounds. No wheezing, rhonchi or  rales.   Abdominal:      General: Abdomen is flat. Bowel sounds are normal.      Palpations: Abdomen is soft.      Tenderness: There is no abdominal tenderness. There is no right CVA tenderness, left CVA tenderness, guarding or rebound. Negative signs include Alan's sign and McBurney's sign.   Musculoskeletal:         General: No swelling or deformity.      Cervical back: Full passive range of motion without pain.      Right lower leg: No edema.      Left lower leg: No edema.   Lymphadenopathy:      Cervical: No cervical adenopathy.   Skin:     Capillary Refill: Capillary refill takes less than 2 seconds.      Coloration: Skin is not jaundiced.      Findings: No rash.   Neurological:      General: No focal deficit present.      Mental Status: He is alert and oriented to person, place, and time. Mental status is at baseline.      Gait: Gait is intact.   Psychiatric:         Mood and Affect: Mood normal.         Behavior: Behavior is cooperative.         Labs Reviewed   CBC WITH AUTO DIFFERENTIAL - Abnormal       Result Value    WBC 6.3      nRBC 0.0      RBC 4.37 (*)     Hemoglobin 13.2 (*)     Hematocrit 40.6 (*)     MCV 93      MCH 30.2      MCHC 32.5      RDW 13.5      Platelets 159      Neutrophils % 68.0      Immature Granulocytes %, Automated 0.5      Lymphocytes % 19.4      Monocytes % 9.5      Eosinophils % 2.1      Basophils % 0.5      Neutrophils Absolute 4.29      Immature Granulocytes Absolute, Automated 0.03      Lymphocytes Absolute 1.22      Monocytes Absolute 0.60      Eosinophils Absolute 0.13      Basophils Absolute 0.03     COMPREHENSIVE METABOLIC PANEL - Abnormal    Glucose 102 (*)     Sodium 139      Potassium 4.1      Chloride 105      Bicarbonate 27      Anion Gap 11      Urea Nitrogen 25 (*)     Creatinine 1.11      eGFR 70      Calcium 9.9      Albumin 4.4      Alkaline Phosphatase 63      Total Protein 7.3      AST 20      Bilirubin, Total 1.0      ALT 23     C-REACTIVE PROTEIN - Normal     C-Reactive Protein 0.67     SEDIMENTATION RATE, AUTOMATED - Normal    Sedimentation Rate 6         CT orbit w IV contrast   Final Result   No evidence for intraorbital mass or abscess.        Nonspecific nodule within the superficial right parotid gland.   Differential considerations include intraparotid lymph node,   pleomorphic adenoma, or Warthin's tumor. ENT follow-up for   consideration of sampling is recommended.        MACRO:   None        Signed by: Richard Alcala 6/5/2025 4:40 PM   Dictation workstation:   [id number]      CT angio head and neck w and wo IV contrast   Final Result   1. 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 Poor visualization of the   origin of the left vertebral artery.        2. Otherwise remaining intracranial and neck vasculatures are patent.        Recommendation: If clinically concerned for acute ischemia, further   evaluation with MR of the brain without contrast is recommended.        MACRO:   Critical Finding:  See findings. Notification was initiated on   6/5/2025 at 4:17 pm by  Tyrone Schwarz.  (**-OCF-**)        Signed by: Tyrone Schwarz 6/5/2025 4:17 PM   Dictation workstation:   [id number]               ED Course & MDM   ED Course as of 06/05/25 1840   Thu Jun 05, 2025   140 Discussed the case with Dr. Dyson. [facility], who is concerned for a vascular issue given his history of amaurosis fugax. Agrees with the above plan of care and states that if the work up is negative that he should follow up in their office tomorrow for further evaluation.  [JZ]   5364 ED Attending Attestation: this is a 74-year-old male who presents with an episode of dizziness.  He specifically notes that the colors of the road were different white and green lines were green and blue respectively.  He felt dizzy at the time.  Whole episode lasted approximately 20 minutes.  He does have a history of TIA in the  past.  Denies a headache with this.  No tenderness in the left temple.  No recent head trauma.  No recent fevers chills.  On my exam patient did not have any focal neurological deficits.  No tenderness to palpation in the left temple area to suggest giant cell arteritis.  Sed rate was normal.  Given his history of TIA, there was concern for ocular stroke therefore CTA head and neck as well as CT orbit was obtained.  He does follow with [facility] [place], can probably be seen as soon as tomorrow if he has negative imaging.  If imaging is concerning, will consult [facility] optho & transport to [facility] for emergent ophthalmology evaluation.  He is now back to baseline.  NIH score 0. [KF]   1640 Right eye pressures are 21, 21, and 16. Left eye pressures were 10, 10, and 11.  [JZ]   1723 Reviewed the CTA results and discussed them with the patient who reports that he has had an occluded left carotid artery. Reaching out to neurology for recommendations.  [JZ]   1744 Dr. Sloan, bneurologist on call, feels that as long as the symptoms were ocular in nature that he can follow up as an outpatient. He is to continue the Asprin and Plavix as previously directed.  [JZ]      ED Course User Index  [JZ] Gage Cormier, APRN-CNP  [KF] Merlin Beasley MD MPH         Diagnoses as of 06/05/25 1840   Visual color changes           Medical Decision Making  Patient was seen and evaluated by Dr. Beasley. Placed on a cardiac monitor which showed normal sinus rhythm without ectopy throughout ED stay. Rhythm strip obtained showed normal sinus rhythm. Impression: No acute pathology. Continuous pulse oximetry monitoring showed no signs of hypoxia. Saline lock was established with labs drawn and results as above. Blood counts, electrolytes, kidney function, liver function, and inflammatory markers were unremarkable. CTA head, neck, and orbits with contrast shows complete occlusion of the left internal carotid artery in the neck to the  supraclinoid segment in the head; poor visualization of the origin of the left vertebral artery; otherwise remaining intracranial and neck vasculatures are patent; no evidence for intraorbital mass or abscess; and nonspecific nodule within the superficial right parotid gland. Patient states that he was aware of the occlusion. Carotid US from 2022 shows findings that are consistent with an occlusion of the left ICA. He has been on Aspirin and Plavix secondary to such. Discussed the case with Dr. Sloan, neurologist on call, who feels that there is no clinical indication for further emergent work up at this time. Notes that he is on the appropriate treatment for the CTA findings. Notes that the symptoms were only ocular in nature and he had multiple benign serial neurological exams without any focal neurological deficits, he can follow up as an outpatient. Patient was educated on the importance of such as well as follow up with eye doctor tomorrow. He was made aware the parotid gland nodule that needs further outpatient evaluation as well. Given strict return precautions. Discharged in stable condition with computer instructions.     Diagnostic Impression:    1. Acute transient vision change in the left eye               Your medication list        ASK your doctor about these medications        Instructions Last Dose Given Next Dose Due   aspirin 81 mg EC tablet           atorvastatin 40 mg tablet  Commonly known as: Lipitor      Take 1 tablet (40 mg) by mouth once daily.       biotin 5,000 mcg tablet, sublingual           carvedilol 25 mg tablet  Commonly known as: Coreg      Take 1 tablet (25 mg) by mouth 2 times a day.       cholecalciferol 125 mcg (5,000 units) tablet  Commonly known as: Vitamin D-3           clopidogrel 75 mg tablet  Commonly known as: Plavix      Take 1 tablet (75 mg) by mouth once daily.       cyanocobalamin (vitamin B-12) 1,000 mcg tablet extended release  Commonly known as: Vitamin  B-12           losartan 50 mg tablet  Commonly known as: Cozaar      Take 1 tablet (50 mg) by mouth 2 times a day.       metFORMIN  mg 24 hr tablet  Commonly known as: Glucophage-XR      Take 2 tablets (1,000 mg) by mouth once daily in the evening. Take with meals. Do not crush, chew, or split.       tadalafil 5 mg tablet  Commonly known as: Cialis      Take 1 tablet (5 mg) by mouth once daily.       tamsulosin 0.4 mg 24 hr capsule  Commonly known as: Flomax      Take 1 capsule (0.4 mg) by mouth once daily.                  Procedure  Procedures         [1]  Past Medical History:  Diagnosis Date   • Bilateral primary osteoarthritis of knee 06/28/2022    Osteoarthritis of knees, bilateral   • Carpal tunnel syndrome, left upper limb 01/06/2023    Carpal tunnel syndrome of left wrist   • Essential (primary) hypertension 08/09/2022    Benign essential HTN   • Gastro-esophageal reflux disease without esophagitis 06/28/2022    GERD (gastroesophageal reflux disease)   • Hyperlipidemia, unspecified 08/09/2022    Hyperlipemia   • Male erectile dysfunction, unspecified 08/21/2016    Erectile dysfunction   • Morbid (severe) obesity due to excess calories (Multi) 08/09/2022    Class 2 severe obesity with serious comorbidity and body mass index (BMI) of 37.0 to 37.9 in adult, unspecified obesity type   • Obstructive sleep apnea (adult) (pediatric) 09/08/2022    ALVIN on CPAP   • Other conditions influencing health status 08/21/2016    Normal echocardiogram   • Personal history of other diseases of the circulatory system     History of hypertension   • Personal history of other endocrine, nutritional and metabolic disease 08/21/2016    History of diabetes mellitus   • Personal history of urinary (tract) infections 06/29/2016    History of urinary tract infection   • Prediabetes 08/09/2022    Prediabetes   [2]  Past Surgical History:  Procedure Laterality Date   • CORONARY ARTERY BYPASS GRAFT  08/08/2017    CABG   • CT ANGIO  NECK  2017    CT NECK ANGIO W AND WO IV CONTRAST 2017 [facility]   • CT HEAD ANGIO W AND WO IV CONTRAST  2017    CT HEAD ANGIO W AND WO IV CONTRAST 2017 [facility] [place] [facility]   • GASTRIC BYPASS  2017    High Gastric Bypass   • OTHER SURGICAL HISTORY  2017    Carotid Artery Catheterization   • TOTAL KNEE ARTHROPLASTY  2016    Knee Replacement   [3]  Family History  Problem Relation Name Age of Onset   • No Known Problems Mother     • No Known Problems Father     [4]  Social History  Tobacco Use   • Smoking status: Former     Current packs/day: 0.00     Types: Cigarettes     Quit date:      Years since quittin.4   • Smokeless tobacco: Never   Vaping Use   • Vaping status: Never Used   Substance Use Topics   • Alcohol use: Yes     Alcohol/week: 14.0 standard drinks of alcohol     Types: 14 Glasses of wine per week   • Drug use: Never      Gaeg Cormier, ANNIKA-CNP  [phone]

## 2025-06-05 NOTE — PROGRESS NOTES
Subjective   Patient ID: Rommel Thompson is a 74 y.o. male. They present today with a chief complaint of Blurred Vision (Presents with left eye blurring, mild nausea and dizziness for 20 minutes. ).    History of Present Illness  A 74 male accompanied with his significant other arrives to clinic with chief complaint of left eye blurry vision, mild nausea, dizziness, and loss sense of color.  He reports that this happened 20 minutes prior to arrival which would be 12:27 PM.  That was his last known well.  He states that he went hiking this morning and had a big meal however he only drank 1 cup of coffee.  He does have multiple comorbidities however states that they are controlled.  He also reports an unsteady gait that started as well.  No generalized weakness.  He states that he has difficulty with color now.  He states that something that would be white is a different color.  He also reports left eye pressure with no trauma.  He is here for evaluation.  Blurred Vision  Associated symptoms: headaches and nausea        Past Medical History  Allergies as of 06/05/2025 - Reviewed 06/05/2025   Allergen Reaction Noted    Sulfamethoxazole-trimethoprim Unknown 05/09/2023       Prescriptions Prior to Admission[1]     Medical History[2]    Surgical History[3]     reports that he quit smoking about 24 years ago. His smoking use included cigarettes. He has never used smokeless tobacco. He reports current alcohol use of about 14.0 standard drinks of alcohol per week. He reports that he does not use drugs.    Review of Systems  Review of Systems   Constitutional:  Positive for activity change.   HENT: Negative.     Eyes:  Positive for blurred vision and visual disturbance.   Respiratory: Negative.     Cardiovascular: Negative.    Gastrointestinal:  Positive for nausea.   Neurological:  Positive for dizziness, weakness, light-headedness and headaches.       Objective    Vitals:    06/05/25 1303   BP: (!) 106/43   Pulse: 55   Resp:  17   Temp: 36.4 °C (97.6 °F)   TempSrc: Oral   SpO2: 97%     No LMP for male patient.    Physical Exam  Constitutional:       Appearance: Normal appearance.   HENT:      Head: Normocephalic and atraumatic.      Right Ear: Tympanic membrane normal.      Left Ear: Tympanic membrane normal.      Nose: Nose normal.   Neurological:      General: No focal deficit present.      Mental Status: He is alert and oriented to person, place, and time.      Motor: Weakness present.      Gait: Gait abnormal.         Procedures    Point of Care Test & Imaging Results from this visit  Results for orders placed or performed in visit on 06/05/25   POCT fingerstick glucose manually resulted   Result Value Ref Range    POC Fingerstick Blood Glucose 111 (A) 70 - 100 mg/dl      Imaging  No results found.    Cardiology, Vascular, and Other Imaging  No other imaging results found for the past 2 days      Diagnostic study results (if any) were reviewed by MARIBEL James.    Assessment/Plan   Allergies, medications, history, and pertinent labs/EKGs/Imaging reviewed by MARIBEL James.     Medical Decision Making  NIH Stroke Scale: 1  Upon initial assessment, the patient was sitting, in bedside chair no acute distress.  NIH stroke scale is 1 with left-sided loss of vision.  Blood sugar in the office was 111.  Given the acute symptoms, I recommend that she had to the emergency department for higher level of care.  Both the patient and family member agreed to the plan of care was discharged to [facility] emergency department.    This document was generated using the assistance of voice recognition software. If there are any errors of spelling, grammar, syntax, or meaning; please feel free to contact me directly for clarification.   Orders and Diagnoses  Diagnoses and all orders for this visit:  Eye pressure  Blurry vision, left eye  -     POCT fingerstick glucose manually resulted  Visual  disturbance  Difficulty walking      Medical Admin Record      Patient disposition: ED    Electronically signed by MARIBEL James  1:45 PM           [1] (Not in a hospital admission)   [2]   Past Medical History:  Diagnosis Date    Bilateral primary osteoarthritis of knee 06/28/2022    Osteoarthritis of knees, bilateral    Carpal tunnel syndrome, left upper limb 01/06/2023    Carpal tunnel syndrome of left wrist    Essential (primary) hypertension 08/09/2022    Benign essential HTN    Gastro-esophageal reflux disease without esophagitis 06/28/2022    GERD (gastroesophageal reflux disease)    Hyperlipidemia, unspecified 08/09/2022    Hyperlipemia    Male erectile dysfunction, unspecified 08/21/2016    Erectile dysfunction    Morbid (severe) obesity due to excess calories (Multi) 08/09/2022    Class 2 severe obesity with serious comorbidity and body mass index (BMI) of 37.0 to 37.9 in adult, unspecified obesity type    Obstructive sleep apnea (adult) (pediatric) 09/08/2022    ALVIN on CPAP    Other conditions influencing health status 08/21/2016    Normal echocardiogram    Personal history of other diseases of the circulatory system     History of hypertension    Personal history of other endocrine, nutritional and metabolic disease 08/21/2016    History of diabetes mellitus    Personal history of urinary (tract) infections 06/29/2016    History of urinary tract infection    Prediabetes 08/09/2022    Prediabetes   [3]   Past Surgical History:  Procedure Laterality Date    CORONARY ARTERY BYPASS GRAFT  08/08/2017    CABG    CT ANGIO NECK  8/24/2017    CT NECK ANGIO W AND WO IV CONTRAST 8/24/2017 [facility] [place] [facility]    CT HEAD ANGIO W AND WO IV CONTRAST  8/24/2017    CT HEAD ANGIO W AND WO IV CONTRAST 8/24/2017 [facility] [place] [facility]    GASTRIC BYPASS  07/17/2017    High Gastric Bypass    OTHER SURGICAL HISTORY  07/17/2017    Carotid Artery Catheterization    TOTAL KNEE ARTHROPLASTY  05/12/2016    Knee  Replacement      DIARRHEA

## 2025-06-06 ENCOUNTER — TELEPHONE (OUTPATIENT)
Dept: URGENT CARE | Age: 75
End: 2025-06-06

## 2025-06-30 ENCOUNTER — PATIENT MESSAGE (OUTPATIENT)
Dept: UROLOGY | Facility: CLINIC | Age: 75
End: 2025-06-30
Payer: MEDICARE

## 2025-06-30 DIAGNOSIS — R39.9 LOWER URINARY TRACT SYMPTOMS (LUTS): ICD-10-CM

## 2025-06-30 DIAGNOSIS — R35.0 URINARY FREQUENCY: ICD-10-CM

## 2025-06-30 DIAGNOSIS — N52.8 OTHER MALE ERECTILE DYSFUNCTION: ICD-10-CM

## 2025-06-30 DIAGNOSIS — R10.9 ACUTE RIGHT FLANK PAIN: ICD-10-CM

## 2025-07-03 RX ORDER — TADALAFIL 5 MG/1
5 TABLET ORAL DAILY
Qty: 30 TABLET | Refills: 1 | Status: SHIPPED | OUTPATIENT
Start: 2025-07-03

## 2025-07-03 RX ORDER — TAMSULOSIN HYDROCHLORIDE 0.4 MG/1
0.4 CAPSULE ORAL DAILY
Qty: 90 CAPSULE | Refills: 0 | Status: SHIPPED | OUTPATIENT
Start: 2025-07-03

## 2025-07-28 ENCOUNTER — TELEPHONE (OUTPATIENT)
Dept: PRIMARY CARE | Facility: CLINIC | Age: 75
End: 2025-07-28
Payer: MEDICARE

## 2025-07-28 DIAGNOSIS — M25.562 ACUTE PAIN OF LEFT KNEE: Primary | ICD-10-CM

## 2025-07-31 ENCOUNTER — OFFICE VISIT (OUTPATIENT)
Dept: URGENT CARE | Age: 75
End: 2025-07-31
Payer: MEDICARE

## 2025-07-31 ENCOUNTER — ANCILLARY PROCEDURE (OUTPATIENT)
Dept: URGENT CARE | Age: 75
End: 2025-07-31
Payer: MEDICARE

## 2025-07-31 VITALS
HEART RATE: 55 BPM | RESPIRATION RATE: 16 BRPM | OXYGEN SATURATION: 98 % | TEMPERATURE: 97.1 F | SYSTOLIC BLOOD PRESSURE: 151 MMHG | DIASTOLIC BLOOD PRESSURE: 68 MMHG

## 2025-07-31 DIAGNOSIS — M25.562 ACUTE PAIN OF LEFT KNEE: ICD-10-CM

## 2025-07-31 DIAGNOSIS — M25.562 ACUTE PAIN OF LEFT KNEE: Primary | ICD-10-CM

## 2025-07-31 PROCEDURE — 1159F MED LIST DOCD IN RCRD: CPT

## 2025-07-31 PROCEDURE — 73564 X-RAY EXAM KNEE 4 OR MORE: CPT | Mod: LEFT SIDE

## 2025-07-31 PROCEDURE — 3078F DIAST BP <80 MM HG: CPT

## 2025-07-31 PROCEDURE — 1160F RVW MEDS BY RX/DR IN RCRD: CPT

## 2025-07-31 PROCEDURE — 1125F AMNT PAIN NOTED PAIN PRSNT: CPT

## 2025-07-31 PROCEDURE — 3077F SYST BP >= 140 MM HG: CPT

## 2025-07-31 PROCEDURE — 99213 OFFICE O/P EST LOW 20 MIN: CPT

## 2025-07-31 RX ORDER — PREDNISONE 20 MG/1
40 TABLET ORAL DAILY
Qty: 10 TABLET | Refills: 0 | Status: SHIPPED | OUTPATIENT
Start: 2025-07-31 | End: 2025-08-05

## 2025-07-31 ASSESSMENT — PAIN SCALES - GENERAL: PAINLEVEL_OUTOF10: 5

## 2025-07-31 NOTE — PROGRESS NOTES
Subjective   Patient ID: Rommel Thompson is a 74 y.o. male. They present today with a chief complaint of lt knee pain (Hx of surgery. No known injury. Has been a while).    History of Present Illness  HPI a 74-year-old male arrives to the clinic with chief complaint of left knee pain.  The patient reports no falls or trauma however did have a bilateral total knee replacement in the year 2015.  He reports no symptoms however over the last couple of of weeks, his symptoms of left knee pain is worsened.  He did notice some generalized swelling to the anterior aspect of his left knee.  He denies any pops or tears, clicks.  He is here for evaluation.    Past Medical History  Allergies as of 07/31/2025 - Reviewed 07/31/2025   Allergen Reaction Noted    Sulfamethoxazole-trimethoprim Unknown 05/09/2023       Prescriptions Prior to Admission[1]     Medical History[2]    Surgical History[3]     reports that he quit smoking about 24 years ago. His smoking use included cigarettes. He has never used smokeless tobacco. He reports current alcohol use of about 14.0 standard drinks of alcohol per week. He reports that he does not use drugs.    Review of Systems  Review of Systems  Joint swelling, myalgia, arthralgia    Objective    Vitals:    07/31/25 1555   BP: 151/68   Pulse: 55   Resp: 16   Temp: 36.2 °C (97.1 °F)   SpO2: 98%     No LMP for male patient.    Physical Exam    Musculoskeletal:      Left knee: Swelling present. Tenderness present.         Procedures    Point of Care Test & Imaging Results from this visit  No results found for this visit on 07/31/25.   Imaging  XR knee left 4+ views  Result Date: 7/31/2025  Left knee arthroplasty. No acute osseous abnormality identified.     MACRO: None   Signed by: Michelle Ochoa 7/31/2025 4:41 PM Dictation workstation:   [id number]      Cardiology, Vascular, and Other Imaging  No other imaging results found for the past 2 days      Diagnostic study results (if any) were reviewed by  MARIBEL James.    Assessment/Plan   Allergies, medications, history, and pertinent labs/EKGs/Imaging reviewed by MARIBEL James.     Medical Decision Making  Upon initial assessment, left lower extremity evaluation does reveal generalized swelling and tenderness to the anterior aspect of his left knee.  No numbness or tingling identified.  Capillary refills less than 3 seconds.  Active and passive range of motion is intact.  Patient does have a slight limp with ambulation.  Given his symptoms, an x-ray of his left knee was ordered.    Left knee x-ray: Left knee arthroplasty with no acute osseous abnormalities identified.    Given his symptoms, likely a left knee strain/overusage that can happen at times.  I did send the patient with prednisone 20 mg oral tablet twice a day for 5 days.  RICE techniques as discussed.  Follow-up with your primary care provider.  Orthopedic referral placed.  Patient agrees to plan of care was discharged stable condition.    As a result of the work-up, the patient was discharged home.  he was informed of his diagnosis and instructed to come back with any concerns or worsening of condition.  he and was agreeable to the plan as discussed above.  he was given the opportunity to ask questions.  All of the patient's questions were answered.    This document was generated using the assistance of voice recognition software. If there are any errors of spelling, grammar, syntax, or meaning; please feel free to contact me directly for clarification.     Orders and Diagnoses  Diagnoses and all orders for this visit:  Acute pain of left knee  -     XR knee left 4+ views; Future  -     Referral to Orthopedics and Sports Medicine; Future  -     predniSONE (Deltasone) 20 mg tablet; Take 2 tablets (40 mg) by mouth once daily for 5 days.      Medical Admin Record      Patient disposition: Home    Electronically signed by MARIBEL James  5:13 PM           [1] (Not in a  hospital admission)   [2]   Past Medical History:  Diagnosis Date    Bilateral primary osteoarthritis of knee 06/28/2022    Osteoarthritis of knees, bilateral    Carpal tunnel syndrome, left upper limb 01/06/2023    Carpal tunnel syndrome of left wrist    Essential (primary) hypertension 08/09/2022    Benign essential HTN    Gastro-esophageal reflux disease without esophagitis 06/28/2022    GERD (gastroesophageal reflux disease)    Hyperlipidemia, unspecified 08/09/2022    Hyperlipemia    Male erectile dysfunction, unspecified 08/21/2016    Erectile dysfunction    Morbid (severe) obesity due to excess calories (Multi) 08/09/2022    Class 2 severe obesity with serious comorbidity and body mass index (BMI) of 37.0 to 37.9 in adult, unspecified obesity type    Obstructive sleep apnea (adult) (pediatric) 09/08/2022    ALVIN on CPAP    Other conditions influencing health status 08/21/2016    Normal echocardiogram    Personal history of other diseases of the circulatory system     History of hypertension    Personal history of other endocrine, nutritional and metabolic disease 08/21/2016    History of diabetes mellitus    Personal history of urinary (tract) infections 06/29/2016    History of urinary tract infection    Prediabetes 08/09/2022    Prediabetes   [3]   Past Surgical History:  Procedure Laterality Date    CORONARY ARTERY BYPASS GRAFT  08/08/2017    CABG    CT ANGIO NECK  8/24/2017    CT NECK ANGIO W AND WO IV CONTRAST 8/24/2017 [facility]    CT HEAD ANGIO W AND WO IV CONTRAST  8/24/2017    CT HEAD ANGIO W AND WO IV CONTRAST 8/24/2017 [facility]    GASTRIC BYPASS  07/17/2017    High Gastric Bypass    OTHER SURGICAL HISTORY  07/17/2017    Carotid Artery Catheterization    TOTAL KNEE ARTHROPLASTY  05/12/2016    Knee Replacement

## 2025-08-05 DIAGNOSIS — M79.673 PAIN OF FOOT, UNSPECIFIED LATERALITY: ICD-10-CM

## 2025-08-06 ENCOUNTER — APPOINTMENT (OUTPATIENT)
Dept: RADIOLOGY | Facility: HOSPITAL | Age: 75
End: 2025-08-06
Payer: MEDICARE

## 2025-08-06 ENCOUNTER — APPOINTMENT (OUTPATIENT)
Dept: ORTHOPEDIC SURGERY | Facility: HOSPITAL | Age: 75
End: 2025-08-06
Payer: MEDICARE

## 2025-09-02 ENCOUNTER — APPOINTMENT (OUTPATIENT)
Dept: ORTHOPEDIC SURGERY | Facility: HOSPITAL | Age: 75
End: 2025-09-02
Payer: MEDICARE

## 2025-09-30 ENCOUNTER — APPOINTMENT (OUTPATIENT)
Dept: CARDIOLOGY | Facility: HOSPITAL | Age: 75
End: 2025-09-30
Payer: MEDICARE

## 2025-10-15 ENCOUNTER — APPOINTMENT (OUTPATIENT)
Dept: PRIMARY CARE | Facility: CLINIC | Age: 75
End: 2025-10-15
Payer: MEDICARE

## 2025-11-12 ENCOUNTER — APPOINTMENT (OUTPATIENT)
Dept: PRIMARY CARE | Facility: CLINIC | Age: 75
End: 2025-11-12
Payer: MEDICARE